# Patient Record
Sex: MALE | Race: WHITE | NOT HISPANIC OR LATINO | Employment: OTHER | URBAN - METROPOLITAN AREA
[De-identification: names, ages, dates, MRNs, and addresses within clinical notes are randomized per-mention and may not be internally consistent; named-entity substitution may affect disease eponyms.]

---

## 2018-01-25 LAB
HEP C VIRUS AB: NEGATIVE
HIV: NON REACTIVE

## 2018-01-30 LAB
CHOLEST SERPL-MSCNC: 151 MG/DL (ref 0–200)
HDLC SERPL-MCNC: 83 MG/DL
LDLC SERPL CALC-MCNC: 57 MG/DL
TRIGL SERPL-MCNC: 57 MG/DL

## 2020-01-01 ENCOUNTER — PATIENT OUTREACH (OUTPATIENT)
Dept: ADMINISTRATIVE | Facility: OTHER | Age: 65
End: 2020-01-01

## 2020-01-01 ENCOUNTER — OFFICE VISIT (OUTPATIENT)
Dept: PODIATRY | Facility: CLINIC | Age: 65
End: 2020-01-01

## 2020-01-01 ENCOUNTER — PATIENT OUTREACH (OUTPATIENT)
Dept: ADMINISTRATIVE | Facility: HOSPITAL | Age: 65
End: 2020-01-01

## 2020-01-01 ENCOUNTER — HOSPITAL ENCOUNTER (INPATIENT)
Facility: HOSPITAL | Age: 65
LOS: 8 days | DRG: 640 | End: 2020-11-17
Attending: EMERGENCY MEDICINE | Admitting: FAMILY MEDICINE
Payer: COMMERCIAL

## 2020-01-01 ENCOUNTER — HOSPITAL ENCOUNTER (OUTPATIENT)
Dept: RADIOLOGY | Facility: HOSPITAL | Age: 65
Discharge: HOME | End: 2020-08-03
Attending: PODIATRIST

## 2020-01-01 ENCOUNTER — OFFICE VISIT (OUTPATIENT)
Dept: PODIATRY | Facility: CLINIC | Age: 65
End: 2020-01-01
Payer: MEDICARE

## 2020-01-01 ENCOUNTER — TELEPHONE (OUTPATIENT)
Dept: PODIATRY | Facility: CLINIC | Age: 65
End: 2020-01-01

## 2020-01-01 ENCOUNTER — OFFICE VISIT (OUTPATIENT)
Dept: FAMILY MEDICINE | Facility: CLINIC | Age: 65
End: 2020-01-01

## 2020-01-01 ENCOUNTER — HOSPITAL ENCOUNTER (EMERGENCY)
Facility: HOSPITAL | Age: 65
Discharge: HOME | End: 2020-03-27
Attending: EMERGENCY MEDICINE

## 2020-01-01 ENCOUNTER — TELEPHONE (OUTPATIENT)
Dept: FAMILY MEDICINE | Facility: CLINIC | Age: 65
End: 2020-01-01

## 2020-01-01 VITALS
WEIGHT: 145 LBS | TEMPERATURE: 98 F | BODY MASS INDEX: 19.22 KG/M2 | HEIGHT: 73 IN | SYSTOLIC BLOOD PRESSURE: 123 MMHG | HEART RATE: 85 BPM | DIASTOLIC BLOOD PRESSURE: 74 MMHG

## 2020-01-01 VITALS
SYSTOLIC BLOOD PRESSURE: 125 MMHG | BODY MASS INDEX: 19.22 KG/M2 | WEIGHT: 145 LBS | RESPIRATION RATE: 18 BRPM | DIASTOLIC BLOOD PRESSURE: 82 MMHG | TEMPERATURE: 98 F | HEIGHT: 73 IN | HEART RATE: 82 BPM | OXYGEN SATURATION: 99 %

## 2020-01-01 VITALS
HEIGHT: 73 IN | SYSTOLIC BLOOD PRESSURE: 106 MMHG | WEIGHT: 145 LBS | HEART RATE: 63 BPM | TEMPERATURE: 98 F | DIASTOLIC BLOOD PRESSURE: 77 MMHG | BODY MASS INDEX: 19.22 KG/M2

## 2020-01-01 VITALS
HEART RATE: 94 BPM | OXYGEN SATURATION: 96 % | BODY MASS INDEX: 19.76 KG/M2 | RESPIRATION RATE: 20 BRPM | WEIGHT: 154 LBS | DIASTOLIC BLOOD PRESSURE: 80 MMHG | HEIGHT: 74 IN | TEMPERATURE: 98 F | SYSTOLIC BLOOD PRESSURE: 110 MMHG

## 2020-01-01 VITALS
WEIGHT: 145 LBS | HEIGHT: 73 IN | WEIGHT: 145 LBS | BODY MASS INDEX: 19.22 KG/M2 | SYSTOLIC BLOOD PRESSURE: 116 MMHG | SYSTOLIC BLOOD PRESSURE: 124 MMHG | HEART RATE: 102 BPM | SYSTOLIC BLOOD PRESSURE: 126 MMHG | HEIGHT: 73 IN | DIASTOLIC BLOOD PRESSURE: 83 MMHG | HEART RATE: 77 BPM | DIASTOLIC BLOOD PRESSURE: 81 MMHG | DIASTOLIC BLOOD PRESSURE: 67 MMHG | HEIGHT: 73 IN | HEART RATE: 92 BPM | BODY MASS INDEX: 19.22 KG/M2 | TEMPERATURE: 99 F | WEIGHT: 145 LBS | TEMPERATURE: 98 F | TEMPERATURE: 98 F | BODY MASS INDEX: 19.22 KG/M2

## 2020-01-01 VITALS
HEART RATE: 85 BPM | TEMPERATURE: 97 F | HEIGHT: 73 IN | BODY MASS INDEX: 19.22 KG/M2 | RESPIRATION RATE: 19 BRPM | OXYGEN SATURATION: 98 % | DIASTOLIC BLOOD PRESSURE: 80 MMHG | SYSTOLIC BLOOD PRESSURE: 125 MMHG | WEIGHT: 145 LBS

## 2020-01-01 VITALS
SYSTOLIC BLOOD PRESSURE: 87 MMHG | OXYGEN SATURATION: 97 % | WEIGHT: 173.5 LBS | HEIGHT: 73 IN | HEART RATE: 118 BPM | DIASTOLIC BLOOD PRESSURE: 64 MMHG | TEMPERATURE: 101 F | RESPIRATION RATE: 15 BRPM | BODY MASS INDEX: 22.99 KG/M2

## 2020-01-01 VITALS
DIASTOLIC BLOOD PRESSURE: 68 MMHG | WEIGHT: 141 LBS | SYSTOLIC BLOOD PRESSURE: 109 MMHG | HEIGHT: 73 IN | HEART RATE: 100 BPM | TEMPERATURE: 97 F | OXYGEN SATURATION: 99 % | BODY MASS INDEX: 18.69 KG/M2

## 2020-01-01 VITALS
HEIGHT: 73 IN | HEART RATE: 84 BPM | WEIGHT: 145 LBS | SYSTOLIC BLOOD PRESSURE: 94 MMHG | DIASTOLIC BLOOD PRESSURE: 62 MMHG | TEMPERATURE: 97 F | RESPIRATION RATE: 18 BRPM | BODY MASS INDEX: 19.22 KG/M2

## 2020-01-01 VITALS
HEART RATE: 95 BPM | HEIGHT: 73 IN | DIASTOLIC BLOOD PRESSURE: 75 MMHG | SYSTOLIC BLOOD PRESSURE: 119 MMHG | TEMPERATURE: 98 F | WEIGHT: 145 LBS | BODY MASS INDEX: 19.22 KG/M2

## 2020-01-01 VITALS
SYSTOLIC BLOOD PRESSURE: 106 MMHG | HEART RATE: 77 BPM | DIASTOLIC BLOOD PRESSURE: 67 MMHG | HEIGHT: 73 IN | TEMPERATURE: 98 F | WEIGHT: 145 LBS | BODY MASS INDEX: 19.22 KG/M2

## 2020-01-01 DIAGNOSIS — R41.82 ALTERED MENTAL STATUS: ICD-10-CM

## 2020-01-01 DIAGNOSIS — S81.801D WOUND OF RIGHT LOWER EXTREMITY, SUBSEQUENT ENCOUNTER: ICD-10-CM

## 2020-01-01 DIAGNOSIS — I87.2 VENOUS STASIS DERMATITIS OF BOTH LOWER EXTREMITIES: ICD-10-CM

## 2020-01-01 DIAGNOSIS — J44.9 CHRONIC OBSTRUCTIVE PULMONARY DISEASE, UNSPECIFIED COPD TYPE: ICD-10-CM

## 2020-01-01 DIAGNOSIS — J44.9 CHRONIC OBSTRUCTIVE PULMONARY DISEASE: ICD-10-CM

## 2020-01-01 DIAGNOSIS — M62.82 NON-TRAUMATIC RHABDOMYOLYSIS: ICD-10-CM

## 2020-01-01 DIAGNOSIS — I87.2 VENOUS STASIS DERMATITIS OF BOTH LOWER EXTREMITIES: Primary | ICD-10-CM

## 2020-01-01 DIAGNOSIS — S81.801A WOUND OF RIGHT LOWER EXTREMITY, INITIAL ENCOUNTER: ICD-10-CM

## 2020-01-01 DIAGNOSIS — Z20.822 SUSPECTED COVID-19 VIRUS INFECTION: ICD-10-CM
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
## 2020-01-01 DIAGNOSIS — S81.801D WOUND OF RIGHT LOWER EXTREMITY, SUBSEQUENT ENCOUNTER: Primary | ICD-10-CM

## 2020-01-01 DIAGNOSIS — T85.79XA: ICD-10-CM

## 2020-01-01 DIAGNOSIS — R55 SYNCOPE AND COLLAPSE: Primary | ICD-10-CM

## 2020-01-01 DIAGNOSIS — J96.01 ACUTE RESPIRATORY FAILURE WITH HYPOXIA AND HYPERCAPNIA: ICD-10-CM

## 2020-01-01 DIAGNOSIS — R60.0 EDEMA OF LEG: ICD-10-CM

## 2020-01-01 DIAGNOSIS — A41.9 SEPSIS, DUE TO UNSPECIFIED ORGANISM, UNSPECIFIED WHETHER ACUTE ORGAN DYSFUNCTION PRESENT: ICD-10-CM

## 2020-01-01 DIAGNOSIS — N30.01 ACUTE CYSTITIS WITH HEMATURIA: ICD-10-CM

## 2020-01-01 DIAGNOSIS — I83.019 VENOUS ULCER OF RIGHT LEG: ICD-10-CM

## 2020-01-01 DIAGNOSIS — Z45.2 ENCOUNTER FOR INTRAVENOUS LINE PLACEMENT: ICD-10-CM

## 2020-01-01 DIAGNOSIS — J96.02 ACUTE RESPIRATORY FAILURE WITH HYPOXIA AND HYPERCAPNIA: ICD-10-CM

## 2020-01-01 DIAGNOSIS — I73.9 PERIPHERAL VASCULAR DISEASE: Primary | ICD-10-CM

## 2020-01-01 DIAGNOSIS — S99.911A INJURY OF RIGHT ANKLE, INITIAL ENCOUNTER: ICD-10-CM

## 2020-01-01 DIAGNOSIS — M79.89 LEG SWELLING: ICD-10-CM

## 2020-01-01 DIAGNOSIS — L97.919 VENOUS ULCER OF RIGHT LEG: ICD-10-CM

## 2020-01-01 DIAGNOSIS — Z12.11 COLON CANCER SCREENING: ICD-10-CM

## 2020-01-01 DIAGNOSIS — S99.911D INJURY OF RIGHT ANKLE, SUBSEQUENT ENCOUNTER: ICD-10-CM

## 2020-01-01 DIAGNOSIS — K70.30 ALCOHOLIC CIRRHOSIS: ICD-10-CM

## 2020-01-01 DIAGNOSIS — I73.9 PERIPHERAL VASCULAR DISEASE: ICD-10-CM

## 2020-01-01 DIAGNOSIS — E87.1 HYPONATREMIA: ICD-10-CM

## 2020-01-01 DIAGNOSIS — S99.911D INJURY OF RIGHT ANKLE, SUBSEQUENT ENCOUNTER: Primary | ICD-10-CM
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
## 2020-01-01 DIAGNOSIS — I95.9 HYPOTENSION, UNSPECIFIED HYPOTENSION TYPE: ICD-10-CM

## 2020-01-01 DIAGNOSIS — R60.0 MILD PERIPHERAL EDEMA: Primary | ICD-10-CM

## 2020-01-01 DIAGNOSIS — S81.801A WOUND OF RIGHT LOWER EXTREMITY, INITIAL ENCOUNTER: Primary | ICD-10-CM

## 2020-01-01 DIAGNOSIS — R19.09 LUMP IN THE GROIN: ICD-10-CM

## 2020-01-01 LAB
ABO + RH BLD: NORMAL
ALBUMIN SERPL BCP-MCNC: 1.8 G/DL (ref 3.5–5.2)
ALBUMIN SERPL BCP-MCNC: 2 G/DL (ref 3.5–5.2)
ALBUMIN SERPL BCP-MCNC: 2.4 G/DL (ref 3.5–5.2)
ALBUMIN SERPL BCP-MCNC: 2.5 G/DL (ref 3.5–5.2)
ALBUMIN SERPL BCP-MCNC: 2.6 G/DL (ref 3.5–5.2)
ALBUMIN SERPL BCP-MCNC: 2.9 G/DL (ref 3.5–5.2)
ALBUMIN SERPL BCP-MCNC: 3.2 G/DL (ref 3.5–5.2)
ALBUMIN SERPL BCP-MCNC: 3.4 G/DL (ref 3.5–5.2)
ALBUMIN SERPL BCP-MCNC: 3.6 G/DL (ref 3.5–5.2)
ALLENS TEST: ABNORMAL
ALP SERPL-CCNC: 39 U/L (ref 55–135)
ALP SERPL-CCNC: 44 U/L (ref 55–135)
ALP SERPL-CCNC: 49 U/L (ref 55–135)
ALP SERPL-CCNC: 50 U/L (ref 55–135)
ALP SERPL-CCNC: 55 U/L (ref 55–135)
ALP SERPL-CCNC: 56 U/L (ref 55–135)
ALP SERPL-CCNC: 62 U/L (ref 55–135)
ALP SERPL-CCNC: 66 U/L (ref 55–135)
ALP SERPL-CCNC: 86 U/L (ref 55–135)
ALT SERPL W/O P-5'-P-CCNC: 104 U/L (ref 10–44)
ALT SERPL W/O P-5'-P-CCNC: 114 U/L (ref 10–44)
ALT SERPL W/O P-5'-P-CCNC: 161 U/L (ref 10–44)
ALT SERPL W/O P-5'-P-CCNC: 26 U/L (ref 10–44)
ALT SERPL W/O P-5'-P-CCNC: 30 U/L (ref 10–44)
ALT SERPL W/O P-5'-P-CCNC: 36 U/L (ref 10–44)
ALT SERPL W/O P-5'-P-CCNC: 46 U/L (ref 10–44)
ALT SERPL W/O P-5'-P-CCNC: 56 U/L (ref 10–44)
ALT SERPL W/O P-5'-P-CCNC: 73 U/L (ref 10–44)
AMMONIA PLAS-SCNC: 31 UMOL/L (ref 10–50)
AMPHET+METHAMPHET UR QL: NEGATIVE
ANION GAP SERPL CALC-SCNC: 10 MMOL/L (ref 8–16)
ANION GAP SERPL CALC-SCNC: 12 MMOL/L (ref 8–16)
ANION GAP SERPL CALC-SCNC: 13 MMOL/L (ref 8–16)
ANION GAP SERPL CALC-SCNC: 16 MMOL/L (ref 8–16)
ANION GAP SERPL CALC-SCNC: 16 MMOL/L (ref 8–16)
ANION GAP SERPL CALC-SCNC: 4 MMOL/L (ref 8–16)
ANION GAP SERPL CALC-SCNC: 5 MMOL/L (ref 8–16)
ANION GAP SERPL CALC-SCNC: 6 MMOL/L (ref 8–16)
ANION GAP SERPL CALC-SCNC: 7 MMOL/L (ref 8–16)
ANION GAP SERPL CALC-SCNC: 8 MMOL/L (ref 8–16)
ANION GAP SERPL CALC-SCNC: 9 MMOL/L (ref 8–16)
ANION GAP SERPL CALC-SCNC: 9 MMOL/L (ref 8–16)
APAP SERPL-MCNC: <10 UG/ML (ref 10–20)
APTT BLDCRRT: 29.4 SEC (ref 21–32)
AST SERPL-CCNC: 175 U/L (ref 10–40)
AST SERPL-CCNC: 242 U/L (ref 10–40)
AST SERPL-CCNC: 36 U/L (ref 10–40)
AST SERPL-CCNC: 37 U/L (ref 10–40)
AST SERPL-CCNC: 40 U/L (ref 10–40)
AST SERPL-CCNC: 484 U/L (ref 10–40)
AST SERPL-CCNC: 52 U/L (ref 10–40)
AST SERPL-CCNC: 67 U/L (ref 10–40)
AST SERPL-CCNC: 92 U/L (ref 10–40)
BACTERIA #/AREA URNS HPF: ABNORMAL /HPF
BACTERIA BLD CULT: NORMAL
BACTERIA BLD CULT: NORMAL
BACTERIA SPEC AEROBE CULT: ABNORMAL
BACTERIA UR CULT: ABNORMAL
BARBITURATES UR QL SCN>200 NG/ML: NORMAL
BASOPHILS # BLD AUTO: 0.01 K/UL (ref 0–0.2)
BASOPHILS # BLD AUTO: 0.02 K/UL (ref 0–0.2)
BASOPHILS # BLD AUTO: 0.02 K/UL (ref 0–0.2)
BASOPHILS # BLD AUTO: ABNORMAL K/UL (ref 0–0.2)
BASOPHILS NFR BLD: 0 % (ref 0–1.9)
BASOPHILS NFR BLD: 0.2 % (ref 0–1.9)
BASOPHILS NFR BLD: 0.3 % (ref 0–1.9)
BASOPHILS NFR BLD: 0.5 % (ref 0–1.9)
BASOPHILS NFR BLD: 0.5 % (ref 0–1.9)
BENZODIAZ UR QL SCN>200 NG/ML: NEGATIVE
BILIRUB SERPL-MCNC: 0.9 MG/DL (ref 0.1–1)
BILIRUB SERPL-MCNC: 1 MG/DL (ref 0.1–1)
BILIRUB SERPL-MCNC: 1 MG/DL (ref 0.1–1)
BILIRUB SERPL-MCNC: 1.1 MG/DL (ref 0.1–1)
BILIRUB SERPL-MCNC: 1.1 MG/DL (ref 0.1–1)
BILIRUB SERPL-MCNC: 1.2 MG/DL (ref 0.1–1)
BILIRUB SERPL-MCNC: 1.5 MG/DL (ref 0.1–1)
BILIRUB SERPL-MCNC: 1.6 MG/DL (ref 0.1–1)
BILIRUB SERPL-MCNC: 1.7 MG/DL (ref 0.1–1)
BILIRUB UR QL STRIP: NEGATIVE
BLD GP AB SCN CELLS X3 SERPL QL: NORMAL
BLD PROD TYP BPU: NORMAL
BLD PROD TYP BPU: NORMAL
BLOOD UNIT EXPIRATION DATE: NORMAL
BLOOD UNIT EXPIRATION DATE: NORMAL
BLOOD UNIT TYPE CODE: 5100
BLOOD UNIT TYPE CODE: 5100
BLOOD UNIT TYPE: NORMAL
BLOOD UNIT TYPE: NORMAL
BNP SERPL-MCNC: 122 PG/ML (ref 0–99)
BUN SERPL-MCNC: 10 MG/DL (ref 8–23)
BUN SERPL-MCNC: 5 MG/DL (ref 8–23)
BUN SERPL-MCNC: 6 MG/DL (ref 8–23)
BUN SERPL-MCNC: 7 MG/DL (ref 8–23)
BUN SERPL-MCNC: 8 MG/DL (ref 8–23)
BUN SERPL-MCNC: 8 MG/DL (ref 8–23)
BUN SERPL-MCNC: 9 MG/DL (ref 8–23)
BUN SERPL-MCNC: 9 MG/DL (ref 8–23)
BUN SERPL-MCNC: <5 MG/DL (ref 8–23)
BZE UR QL SCN: NEGATIVE
CALCIUM SERPL-MCNC: 6.6 MG/DL (ref 8.7–10.5)
CALCIUM SERPL-MCNC: 6.7 MG/DL (ref 8.7–10.5)
CALCIUM SERPL-MCNC: 6.7 MG/DL (ref 8.7–10.5)
CALCIUM SERPL-MCNC: 7 MG/DL (ref 8.7–10.5)
CALCIUM SERPL-MCNC: 7.1 MG/DL (ref 8.7–10.5)
CALCIUM SERPL-MCNC: 7.2 MG/DL (ref 8.7–10.5)
CALCIUM SERPL-MCNC: 7.3 MG/DL (ref 8.7–10.5)
CALCIUM SERPL-MCNC: 7.4 MG/DL (ref 8.7–10.5)
CALCIUM SERPL-MCNC: 7.5 MG/DL (ref 8.7–10.5)
CALCIUM SERPL-MCNC: 7.6 MG/DL (ref 8.7–10.5)
CALCIUM SERPL-MCNC: 7.7 MG/DL (ref 8.7–10.5)
CALCIUM SERPL-MCNC: 7.8 MG/DL (ref 8.7–10.5)
CALCIUM SERPL-MCNC: 8.1 MG/DL (ref 8.7–10.5)
CANNABINOIDS UR QL SCN: NEGATIVE
CHLORIDE SERPL-SCNC: 72 MMOL/L (ref 95–110)
CHLORIDE SERPL-SCNC: 78 MMOL/L (ref 95–110)
CHLORIDE SERPL-SCNC: 82 MMOL/L (ref 95–110)
CHLORIDE SERPL-SCNC: 82 MMOL/L (ref 95–110)
CHLORIDE SERPL-SCNC: 83 MMOL/L (ref 95–110)
CHLORIDE SERPL-SCNC: 84 MMOL/L (ref 95–110)
CHLORIDE SERPL-SCNC: 86 MMOL/L (ref 95–110)
CHLORIDE SERPL-SCNC: 86 MMOL/L (ref 95–110)
CHLORIDE SERPL-SCNC: 87 MMOL/L (ref 95–110)
CHLORIDE SERPL-SCNC: 89 MMOL/L (ref 95–110)
CHLORIDE SERPL-SCNC: 90 MMOL/L (ref 95–110)
CHLORIDE SERPL-SCNC: 91 MMOL/L (ref 95–110)
CHLORIDE SERPL-SCNC: 91 MMOL/L (ref 95–110)
CHLORIDE SERPL-SCNC: 92 MMOL/L (ref 95–110)
CHLORIDE SERPL-SCNC: 93 MMOL/L (ref 95–110)
CHLORIDE SERPL-SCNC: 94 MMOL/L (ref 95–110)
CHLORIDE SERPL-SCNC: 94 MMOL/L (ref 95–110)
CHLORIDE SERPL-SCNC: 96 MMOL/L (ref 95–110)
CHLORIDE SERPL-SCNC: 96 MMOL/L (ref 95–110)
CHLORIDE SERPL-SCNC: 97 MMOL/L (ref 95–110)
CK SERPL-CCNC: 1063 U/L (ref 20–200)
CK SERPL-CCNC: 132 U/L (ref 20–200)
CK SERPL-CCNC: 1503 U/L (ref 20–200)
CK SERPL-CCNC: 166 U/L (ref 20–200)
CK SERPL-CCNC: 1837 U/L (ref 20–200)
CK SERPL-CCNC: 189 U/L (ref 20–200)
CK SERPL-CCNC: 242 U/L (ref 20–200)
CK SERPL-CCNC: 2526 U/L (ref 20–200)
CK SERPL-CCNC: 277 U/L (ref 20–200)
CK SERPL-CCNC: 289 U/L (ref 20–200)
CK SERPL-CCNC: 323 U/L (ref 20–200)
CK SERPL-CCNC: 4093 U/L (ref 20–200)
CK SERPL-CCNC: 551 U/L (ref 20–200)
CK SERPL-CCNC: 678 U/L (ref 20–200)
CK SERPL-CCNC: 707 U/L (ref 20–200)
CK SERPL-CCNC: 7086 U/L (ref 20–200)
CK SERPL-CCNC: 7086 U/L (ref 20–200)
CLARITY UR: CLEAR
CO2 SERPL-SCNC: 22 MMOL/L (ref 23–29)
CO2 SERPL-SCNC: 24 MMOL/L (ref 23–29)
CO2 SERPL-SCNC: 24 MMOL/L (ref 23–29)
CO2 SERPL-SCNC: 25 MMOL/L (ref 23–29)
CO2 SERPL-SCNC: 26 MMOL/L (ref 23–29)
CO2 SERPL-SCNC: 27 MMOL/L (ref 23–29)
CO2 SERPL-SCNC: 28 MMOL/L (ref 23–29)
CO2 SERPL-SCNC: 28 MMOL/L (ref 23–29)
CO2 SERPL-SCNC: 29 MMOL/L (ref 23–29)
CO2 SERPL-SCNC: 29 MMOL/L (ref 23–29)
CODING SYSTEM: NORMAL
CODING SYSTEM: NORMAL
COLOR UR: YELLOW
CREAT SERPL-MCNC: 0.3 MG/DL (ref 0.5–1.4)
CREAT SERPL-MCNC: 0.4 MG/DL (ref 0.5–1.4)
CREAT SERPL-MCNC: 0.5 MG/DL (ref 0.5–1.4)
CREAT SERPL-MCNC: <0.3 MG/DL (ref 0.5–1.4)
CREAT UR-MCNC: 67.3 MG/DL (ref 23–375)
CRP SERPL-MCNC: 5.27 MG/DL (ref 0–0.75)
DELSYS: ABNORMAL
DIFFERENTIAL METHOD: ABNORMAL
DISPENSE STATUS: NORMAL
DISPENSE STATUS: NORMAL
EOSINOPHIL # BLD AUTO: 0 K/UL (ref 0–0.5)
EOSINOPHIL # BLD AUTO: ABNORMAL K/UL (ref 0–0.5)
EOSINOPHIL NFR BLD: 0 % (ref 0–8)
EOSINOPHIL NFR BLD: 0 % (ref 0–8)
EOSINOPHIL NFR BLD: 0.3 % (ref 0–8)
EOSINOPHIL NFR BLD: 0.5 % (ref 0–8)
EOSINOPHIL NFR BLD: 0.8 % (ref 0–8)
EOSINOPHIL NFR BLD: 0.9 % (ref 0–8)
EOSINOPHIL NFR BLD: 3 % (ref 0–8)
EOSINOPHIL NFR BLD: 5 % (ref 0–8)
EP: 6
ERYTHROCYTE [DISTWIDTH] IN BLOOD BY AUTOMATED COUNT: 12.3 % (ref 11.5–14.5)
ERYTHROCYTE [DISTWIDTH] IN BLOOD BY AUTOMATED COUNT: 12.9 % (ref 11.5–14.5)
ERYTHROCYTE [DISTWIDTH] IN BLOOD BY AUTOMATED COUNT: 13.3 % (ref 11.5–14.5)
ERYTHROCYTE [DISTWIDTH] IN BLOOD BY AUTOMATED COUNT: 13.6 % (ref 11.5–14.5)
ERYTHROCYTE [DISTWIDTH] IN BLOOD BY AUTOMATED COUNT: 13.8 % (ref 11.5–14.5)
ERYTHROCYTE [DISTWIDTH] IN BLOOD BY AUTOMATED COUNT: 13.8 % (ref 11.5–14.5)
ERYTHROCYTE [DISTWIDTH] IN BLOOD BY AUTOMATED COUNT: 14.1 % (ref 11.5–14.5)
ERYTHROCYTE [DISTWIDTH] IN BLOOD BY AUTOMATED COUNT: 14.3 % (ref 11.5–14.5)
ERYTHROCYTE [DISTWIDTH] IN BLOOD BY AUTOMATED COUNT: 14.4 % (ref 11.5–14.5)
ERYTHROCYTE [DISTWIDTH] IN BLOOD BY AUTOMATED COUNT: 14.5 % (ref 11.5–14.5)
ERYTHROCYTE [SEDIMENTATION RATE] IN BLOOD BY WESTERGREN METHOD: 16 MM/H
ERYTHROCYTE [SEDIMENTATION RATE] IN BLOOD BY WESTERGREN METHOD: 18 MM/H
EST. GFR  (AFRICAN AMERICAN): >60 ML/MIN/1.73 M^2
EST. GFR  (NON AFRICAN AMERICAN): >60 ML/MIN/1.73 M^2
ETHANOL SERPL-MCNC: <5 MG/DL
FERRITIN SERPL-MCNC: 1230 NG/ML (ref 20–300)
FIO2: 32
FIO2: 35
FIO2: 40
FIO2: 45
FIO2: 50
FIO2: 60
FIO2: 60
FLOW: 3
GLUCOSE SERPL-MCNC: 101 MG/DL (ref 70–110)
GLUCOSE SERPL-MCNC: 109 MG/DL (ref 70–110)
GLUCOSE SERPL-MCNC: 110 MG/DL (ref 70–110)
GLUCOSE SERPL-MCNC: 112 MG/DL (ref 70–110)
GLUCOSE SERPL-MCNC: 112 MG/DL (ref 70–110)
GLUCOSE SERPL-MCNC: 116 MG/DL (ref 70–110)
GLUCOSE SERPL-MCNC: 118 MG/DL (ref 70–110)
GLUCOSE SERPL-MCNC: 118 MG/DL (ref 70–110)
GLUCOSE SERPL-MCNC: 122 MG/DL (ref 70–110)
GLUCOSE SERPL-MCNC: 122 MG/DL (ref 70–110)
GLUCOSE SERPL-MCNC: 134 MG/DL (ref 70–110)
GLUCOSE SERPL-MCNC: 137 MG/DL (ref 70–110)
GLUCOSE SERPL-MCNC: 140 MG/DL (ref 70–110)
GLUCOSE SERPL-MCNC: 152 MG/DL (ref 70–110)
GLUCOSE SERPL-MCNC: 153 MG/DL (ref 70–110)
GLUCOSE SERPL-MCNC: 158 MG/DL (ref 70–110)
GLUCOSE SERPL-MCNC: 160 MG/DL (ref 70–110)
GLUCOSE SERPL-MCNC: 170 MG/DL (ref 70–110)
GLUCOSE SERPL-MCNC: 170 MG/DL (ref 70–110)
GLUCOSE SERPL-MCNC: 180 MG/DL (ref 70–110)
GLUCOSE SERPL-MCNC: 185 MG/DL (ref 70–110)
GLUCOSE SERPL-MCNC: 188 MG/DL (ref 70–110)
GLUCOSE SERPL-MCNC: 190 MG/DL (ref 70–110)
GLUCOSE SERPL-MCNC: 89 MG/DL (ref 70–110)
GLUCOSE SERPL-MCNC: 93 MG/DL (ref 70–110)
GLUCOSE SERPL-MCNC: 98 MG/DL (ref 70–110)
GLUCOSE SERPL-MCNC: 99 MG/DL (ref 70–110)
GLUCOSE UR QL STRIP: NEGATIVE
HCO3 UR-SCNC: 23.3 MMOL/L (ref 24–28)
HCO3 UR-SCNC: 25.1 MMOL/L (ref 24–28)
HCO3 UR-SCNC: 25.4 MMOL/L (ref 24–28)
HCO3 UR-SCNC: 25.6 MMOL/L (ref 24–28)
HCO3 UR-SCNC: 25.8 MMOL/L (ref 24–28)
HCO3 UR-SCNC: 26.6 MMOL/L (ref 24–28)
HCO3 UR-SCNC: 27.1 MMOL/L (ref 24–28)
HCO3 UR-SCNC: 27.6 MMOL/L (ref 24–28)
HCO3 UR-SCNC: 27.8 MMOL/L (ref 24–28)
HCO3 UR-SCNC: 28.1 MMOL/L (ref 24–28)
HCO3 UR-SCNC: 28.4 MMOL/L (ref 24–28)
HCO3 UR-SCNC: 28.8 MMOL/L (ref 24–28)
HCO3 UR-SCNC: 29.1 MMOL/L (ref 24–28)
HCO3 UR-SCNC: 30.4 MMOL/L (ref 24–28)
HCO3 UR-SCNC: 31.8 MMOL/L (ref 24–28)
HCT VFR BLD AUTO: 16.6 % (ref 40–54)
HCT VFR BLD AUTO: 22.5 % (ref 40–54)
HCT VFR BLD AUTO: 23.4 % (ref 40–54)
HCT VFR BLD AUTO: 23.5 % (ref 40–54)
HCT VFR BLD AUTO: 24 % (ref 40–54)
HCT VFR BLD AUTO: 24.3 % (ref 40–54)
HCT VFR BLD AUTO: 24.5 % (ref 40–54)
HCT VFR BLD AUTO: 27.8 % (ref 40–54)
HCT VFR BLD AUTO: 29.2 % (ref 40–54)
HCT VFR BLD AUTO: 33 % (ref 40–54)
HGB BLD-MCNC: 10.4 G/DL (ref 14–18)
HGB BLD-MCNC: 10.9 G/DL (ref 14–18)
HGB BLD-MCNC: 11.7 G/DL (ref 14–18)
HGB BLD-MCNC: 6 G/DL (ref 14–18)
HGB BLD-MCNC: 7.9 G/DL (ref 14–18)
HGB BLD-MCNC: 8.1 G/DL (ref 14–18)
HGB BLD-MCNC: 8.2 G/DL (ref 14–18)
HGB BLD-MCNC: 8.3 G/DL (ref 14–18)
HGB BLD-MCNC: 8.5 G/DL (ref 14–18)
HGB BLD-MCNC: 8.6 G/DL (ref 14–18)
HGB UR QL STRIP: ABNORMAL
HYALINE CASTS #/AREA URNS LPF: 0 /LPF
IMM GRANULOCYTES # BLD AUTO: 0.02 K/UL (ref 0–0.04)
IMM GRANULOCYTES # BLD AUTO: 0.03 K/UL (ref 0–0.04)
IMM GRANULOCYTES # BLD AUTO: 0.03 K/UL (ref 0–0.04)
IMM GRANULOCYTES # BLD AUTO: 0.04 K/UL (ref 0–0.04)
IMM GRANULOCYTES # BLD AUTO: ABNORMAL K/UL (ref 0–0.04)
IMM GRANULOCYTES NFR BLD AUTO: 0.5 % (ref 0–0.5)
IMM GRANULOCYTES NFR BLD AUTO: 0.5 % (ref 0–0.5)
IMM GRANULOCYTES NFR BLD AUTO: 0.6 % (ref 0–0.5)
IMM GRANULOCYTES NFR BLD AUTO: 0.7 % (ref 0–0.5)
IMM GRANULOCYTES NFR BLD AUTO: 0.8 % (ref 0–0.5)
IMM GRANULOCYTES NFR BLD AUTO: 1.1 % (ref 0–0.5)
IMM GRANULOCYTES NFR BLD AUTO: ABNORMAL % (ref 0–0.5)
INR PPP: 1.3 (ref 0.8–1.2)
IP: 12
KETONES UR QL STRIP: ABNORMAL
LACTATE SERPL-SCNC: 1.4 MMOL/L (ref 0.5–2.2)
LACTATE SERPL-SCNC: 2.5 MMOL/L (ref 0.5–2.2)
LDH SERPL L TO P-CCNC: 517 U/L (ref 110–260)
LEUKOCYTE ESTERASE UR QL STRIP: ABNORMAL
LYMPHOCYTES # BLD AUTO: 0.3 K/UL (ref 1–4.8)
LYMPHOCYTES # BLD AUTO: 0.4 K/UL (ref 1–4.8)
LYMPHOCYTES # BLD AUTO: 0.5 K/UL (ref 1–4.8)
LYMPHOCYTES # BLD AUTO: 0.6 K/UL (ref 1–4.8)
LYMPHOCYTES # BLD AUTO: 0.6 K/UL (ref 1–4.8)
LYMPHOCYTES # BLD AUTO: 0.7 K/UL (ref 1–4.8)
LYMPHOCYTES # BLD AUTO: ABNORMAL K/UL (ref 1–4.8)
LYMPHOCYTES NFR BLD: 12 % (ref 18–48)
LYMPHOCYTES NFR BLD: 14.2 % (ref 18–48)
LYMPHOCYTES NFR BLD: 17.2 % (ref 18–48)
LYMPHOCYTES NFR BLD: 18.1 % (ref 18–48)
LYMPHOCYTES NFR BLD: 22 % (ref 18–48)
LYMPHOCYTES NFR BLD: 24 % (ref 18–48)
LYMPHOCYTES NFR BLD: 30 % (ref 18–48)
LYMPHOCYTES NFR BLD: 8 % (ref 18–48)
LYMPHOCYTES NFR BLD: 9.6 % (ref 18–48)
LYMPHOCYTES NFR BLD: 9.8 % (ref 18–48)
MAGNESIUM SERPL-MCNC: 1 MG/DL (ref 1.6–2.6)
MAGNESIUM SERPL-MCNC: 1.2 MG/DL (ref 1.6–2.6)
MAGNESIUM SERPL-MCNC: 1.3 MG/DL (ref 1.6–2.6)
MAGNESIUM SERPL-MCNC: 1.4 MG/DL (ref 1.6–2.6)
MAGNESIUM SERPL-MCNC: 1.6 MG/DL (ref 1.6–2.6)
MCH RBC QN AUTO: 31.2 PG (ref 27–31)
MCH RBC QN AUTO: 31.3 PG (ref 27–31)
MCH RBC QN AUTO: 31.4 PG (ref 27–31)
MCH RBC QN AUTO: 31.5 PG (ref 27–31)
MCH RBC QN AUTO: 31.6 PG (ref 27–31)
MCH RBC QN AUTO: 32.1 PG (ref 27–31)
MCH RBC QN AUTO: 32.2 PG (ref 27–31)
MCH RBC QN AUTO: 32.4 PG (ref 27–31)
MCHC RBC AUTO-ENTMCNC: 33.8 G/DL (ref 32–36)
MCHC RBC AUTO-ENTMCNC: 34.6 G/DL (ref 32–36)
MCHC RBC AUTO-ENTMCNC: 34.7 G/DL (ref 32–36)
MCHC RBC AUTO-ENTMCNC: 34.9 G/DL (ref 32–36)
MCHC RBC AUTO-ENTMCNC: 35.4 G/DL (ref 32–36)
MCHC RBC AUTO-ENTMCNC: 35.5 G/DL (ref 32–36)
MCHC RBC AUTO-ENTMCNC: 36 G/DL (ref 32–36)
MCHC RBC AUTO-ENTMCNC: 36.1 G/DL (ref 32–36)
MCHC RBC AUTO-ENTMCNC: 37.3 G/DL (ref 32–36)
MCHC RBC AUTO-ENTMCNC: 37.4 G/DL (ref 32–36)
MCV RBC AUTO: 86 FL (ref 82–98)
MCV RBC AUTO: 87 FL (ref 82–98)
MCV RBC AUTO: 89 FL (ref 82–98)
MCV RBC AUTO: 89 FL (ref 82–98)
MCV RBC AUTO: 90 FL (ref 82–98)
MCV RBC AUTO: 91 FL (ref 82–98)
MCV RBC AUTO: 91 FL (ref 82–98)
MCV RBC AUTO: 93 FL (ref 82–98)
MICROSCOPIC COMMENT: ABNORMAL
MIN VOL: 7.9
MIN VOL: 8
MIN VOL: 8.6
MODE: ABNORMAL
MONOCYTES # BLD AUTO: 0.4 K/UL (ref 0.3–1)
MONOCYTES # BLD AUTO: 0.6 K/UL (ref 0.3–1)
MONOCYTES # BLD AUTO: ABNORMAL K/UL (ref 0.3–1)
MONOCYTES NFR BLD: 10 % (ref 4–15)
MONOCYTES NFR BLD: 10.1 % (ref 4–15)
MONOCYTES NFR BLD: 11.5 % (ref 4–15)
MONOCYTES NFR BLD: 12.5 % (ref 4–15)
MONOCYTES NFR BLD: 13.1 % (ref 4–15)
MONOCYTES NFR BLD: 4 % (ref 4–15)
MONOCYTES NFR BLD: 4 % (ref 4–15)
MONOCYTES NFR BLD: 6 % (ref 4–15)
MONOCYTES NFR BLD: 6 % (ref 4–15)
MONOCYTES NFR BLD: 9.4 % (ref 4–15)
NEUTROPHILS # BLD AUTO: 2.5 K/UL (ref 1.8–7.7)
NEUTROPHILS # BLD AUTO: 2.5 K/UL (ref 1.8–7.7)
NEUTROPHILS # BLD AUTO: 2.6 K/UL (ref 1.8–7.7)
NEUTROPHILS # BLD AUTO: 3.1 K/UL (ref 1.8–7.7)
NEUTROPHILS # BLD AUTO: 3.2 K/UL (ref 1.8–7.7)
NEUTROPHILS # BLD AUTO: 3.2 K/UL (ref 1.8–7.7)
NEUTROPHILS # BLD AUTO: ABNORMAL K/UL (ref 1.8–7.7)
NEUTROPHILS NFR BLD: 64 % (ref 38–73)
NEUTROPHILS NFR BLD: 66 % (ref 38–73)
NEUTROPHILS NFR BLD: 69.5 % (ref 38–73)
NEUTROPHILS NFR BLD: 69.7 % (ref 38–73)
NEUTROPHILS NFR BLD: 72 % (ref 38–73)
NEUTROPHILS NFR BLD: 73.1 % (ref 38–73)
NEUTROPHILS NFR BLD: 74.9 % (ref 38–73)
NEUTROPHILS NFR BLD: 76.7 % (ref 38–73)
NEUTROPHILS NFR BLD: 78.2 % (ref 38–73)
NEUTROPHILS NFR BLD: 85 % (ref 38–73)
NEUTS BAND NFR BLD MANUAL: 1 %
NITRITE UR QL STRIP: POSITIVE
NRBC BLD-RTO: 0 /100 WBC
NRBC BLD-RTO: 1 /100 WBC
NUM UNITS TRANS PACKED RBC: NORMAL
NUM UNITS TRANS PACKED RBC: NORMAL
OPIATES UR QL SCN: NEGATIVE
PCO2 BLDA: 39.7 MMHG (ref 35–45)
PCO2 BLDA: 40.7 MMHG (ref 35–45)
PCO2 BLDA: 41.3 MMHG (ref 35–45)
PCO2 BLDA: 41.3 MMHG (ref 35–45)
PCO2 BLDA: 42.5 MMHG (ref 35–45)
PCO2 BLDA: 43.3 MMHG (ref 35–45)
PCO2 BLDA: 43.7 MMHG (ref 35–45)
PCO2 BLDA: 43.9 MMHG (ref 35–45)
PCO2 BLDA: 45.3 MMHG (ref 35–45)
PCO2 BLDA: 45.6 MMHG (ref 35–45)
PCO2 BLDA: 45.6 MMHG (ref 35–45)
PCO2 BLDA: 45.8 MMHG (ref 35–45)
PCO2 BLDA: 51.9 MMHG (ref 35–45)
PCO2 BLDA: 55 MMHG (ref 35–45)
PCO2 BLDA: 60.5 MMHG (ref 35–45)
PCP UR QL SCN>25 NG/ML: NEGATIVE
PEEP: 5
PH SMN: 7.3 [PH] (ref 7.35–7.45)
PH SMN: 7.33 [PH] (ref 7.35–7.45)
PH SMN: 7.35 [PH] (ref 7.35–7.45)
PH SMN: 7.35 [PH] (ref 7.35–7.45)
PH SMN: 7.37 [PH] (ref 7.35–7.45)
PH SMN: 7.39 [PH] (ref 7.35–7.45)
PH SMN: 7.39 [PH] (ref 7.35–7.45)
PH SMN: 7.4 [PH] (ref 7.35–7.45)
PH SMN: 7.41 [PH] (ref 7.35–7.45)
PH SMN: 7.42 [PH] (ref 7.35–7.45)
PH SMN: 7.42 [PH] (ref 7.35–7.45)
PH SMN: 7.43 [PH] (ref 7.35–7.45)
PH SMN: 7.45 [PH] (ref 7.35–7.45)
PH UR STRIP: 7 [PH] (ref 5–8)
PHOSPHATE SERPL-MCNC: 2.7 MG/DL (ref 2.7–4.5)
PIP: 17
PIP: 22
PIP: 27
PLATELET # BLD AUTO: 27 K/UL (ref 150–350)
PLATELET # BLD AUTO: 30 K/UL (ref 150–350)
PLATELET # BLD AUTO: 32 K/UL (ref 150–350)
PLATELET # BLD AUTO: 35 K/UL (ref 150–350)
PLATELET # BLD AUTO: 36 K/UL (ref 150–350)
PLATELET # BLD AUTO: 43 K/UL (ref 150–350)
PLATELET # BLD AUTO: 44 K/UL (ref 150–350)
PLATELET # BLD AUTO: 45 K/UL (ref 150–350)
PLATELET # BLD AUTO: 50 K/UL (ref 150–350)
PLATELET # BLD AUTO: 51 K/UL (ref 150–350)
PLATELET BLD QL SMEAR: ABNORMAL
PMV BLD AUTO: 10 FL (ref 9.2–12.9)
PMV BLD AUTO: 10.3 FL (ref 9.2–12.9)
PMV BLD AUTO: 10.3 FL (ref 9.2–12.9)
PMV BLD AUTO: 10.4 FL (ref 9.2–12.9)
PMV BLD AUTO: 10.4 FL (ref 9.2–12.9)
PMV BLD AUTO: 10.7 FL (ref 9.2–12.9)
PMV BLD AUTO: 11.8 FL (ref 9.2–12.9)
PMV BLD AUTO: 9.9 FL (ref 9.2–12.9)
PO2 BLDA: 100 MMHG (ref 80–100)
PO2 BLDA: 105 MMHG (ref 80–100)
PO2 BLDA: 108 MMHG (ref 80–100)
PO2 BLDA: 113 MMHG (ref 80–100)
PO2 BLDA: 116 MMHG (ref 80–100)
PO2 BLDA: 143 MMHG (ref 80–100)
PO2 BLDA: 160 MMHG (ref 80–100)
PO2 BLDA: 191 MMHG (ref 80–100)
PO2 BLDA: 192 MMHG (ref 80–100)
PO2 BLDA: 62 MMHG (ref 80–100)
PO2 BLDA: 69 MMHG (ref 80–100)
PO2 BLDA: 75 MMHG (ref 80–100)
PO2 BLDA: 76 MMHG (ref 80–100)
PO2 BLDA: 77 MMHG (ref 80–100)
PO2 BLDA: 89 MMHG (ref 80–100)
POC BE: -1 MMOL/L
POC BE: -2 MMOL/L
POC BE: 0 MMOL/L
POC BE: 1 MMOL/L
POC BE: 1 MMOL/L
POC BE: 2 MMOL/L
POC BE: 2 MMOL/L
POC BE: 3 MMOL/L
POC BE: 3 MMOL/L
POC BE: 4 MMOL/L
POC BE: 5 MMOL/L
POC BE: 5 MMOL/L
POC BE: 6 MMOL/L
POC SATURATED O2: 100 % (ref 95–100)
POC SATURATED O2: 100 % (ref 95–100)
POC SATURATED O2: 91 % (ref 95–100)
POC SATURATED O2: 92 % (ref 95–100)
POC SATURATED O2: 94 % (ref 95–100)
POC SATURATED O2: 95 % (ref 95–100)
POC SATURATED O2: 95 % (ref 95–100)
POC SATURATED O2: 96 % (ref 95–100)
POC SATURATED O2: 98 % (ref 95–100)
POC SATURATED O2: 99 % (ref 95–100)
POC TCO2: 25 MMOL/L (ref 23–27)
POC TCO2: 27 MMOL/L (ref 23–27)
POC TCO2: 28 MMOL/L (ref 23–27)
POC TCO2: 28 MMOL/L (ref 23–27)
POC TCO2: 29 MMOL/L (ref 23–27)
POC TCO2: 30 MMOL/L (ref 23–27)
POC TCO2: 32 MMOL/L (ref 23–27)
POC TCO2: 34 MMOL/L (ref 23–27)
POCT GLUCOSE: 139 MG/DL (ref 70–110)
POTASSIUM SERPL-SCNC: 2.8 MMOL/L (ref 3.5–5.1)
POTASSIUM SERPL-SCNC: 2.9 MMOL/L (ref 3.5–5.1)
POTASSIUM SERPL-SCNC: 3 MMOL/L (ref 3.5–5.1)
POTASSIUM SERPL-SCNC: 3.2 MMOL/L (ref 3.5–5.1)
POTASSIUM SERPL-SCNC: 3.4 MMOL/L (ref 3.5–5.1)
POTASSIUM SERPL-SCNC: 3.5 MMOL/L (ref 3.5–5.1)
POTASSIUM SERPL-SCNC: 3.6 MMOL/L (ref 3.5–5.1)
POTASSIUM SERPL-SCNC: 3.7 MMOL/L (ref 3.5–5.1)
POTASSIUM SERPL-SCNC: 3.8 MMOL/L (ref 3.5–5.1)
POTASSIUM SERPL-SCNC: 3.9 MMOL/L (ref 3.5–5.1)
POTASSIUM SERPL-SCNC: 4 MMOL/L (ref 3.5–5.1)
POTASSIUM SERPL-SCNC: 4.4 MMOL/L (ref 3.5–5.1)
POTASSIUM SERPL-SCNC: 4.6 MMOL/L (ref 3.5–5.1)
POTASSIUM SERPL-SCNC: 5.2 MMOL/L (ref 3.5–5.1)
PROCALCITONIN SERPL IA-MCNC: 0.11 NG/ML
PROT SERPL-MCNC: 4 G/DL (ref 6–8.4)
PROT SERPL-MCNC: 4 G/DL (ref 6–8.4)
PROT SERPL-MCNC: 4.2 G/DL (ref 6–8.4)
PROT SERPL-MCNC: 4.3 G/DL (ref 6–8.4)
PROT SERPL-MCNC: 4.7 G/DL (ref 6–8.4)
PROT SERPL-MCNC: 4.9 G/DL (ref 6–8.4)
PROT SERPL-MCNC: 5.3 G/DL (ref 6–8.4)
PROT SERPL-MCNC: 5.3 G/DL (ref 6–8.4)
PROT SERPL-MCNC: 5.4 G/DL (ref 6–8.4)
PROT UR QL STRIP: ABNORMAL
PROTHROMBIN TIME: 12.6 SEC (ref 9–12.5)
RBC # BLD AUTO: 1.85 M/UL (ref 4.6–6.2)
RBC # BLD AUTO: 2.52 M/UL (ref 4.6–6.2)
RBC # BLD AUTO: 2.53 M/UL (ref 4.6–6.2)
RBC # BLD AUTO: 2.62 M/UL (ref 4.6–6.2)
RBC # BLD AUTO: 2.63 M/UL (ref 4.6–6.2)
RBC # BLD AUTO: 2.71 M/UL (ref 4.6–6.2)
RBC # BLD AUTO: 2.73 M/UL (ref 4.6–6.2)
RBC # BLD AUTO: 3.21 M/UL (ref 4.6–6.2)
RBC # BLD AUTO: 3.38 M/UL (ref 4.6–6.2)
RBC # BLD AUTO: 3.61 M/UL (ref 4.6–6.2)
RBC #/AREA URNS HPF: 10 /HPF (ref 0–4)
SALICYLATES SERPL-MCNC: <4 MG/DL (ref 15–30)
SAMPLE: ABNORMAL
SARS-COV-2 RDRP RESP QL NAA+PROBE: NEGATIVE
SITE: ABNORMAL
SODIUM SERPL-SCNC: 116 MMOL/L (ref 136–145)
SODIUM SERPL-SCNC: 117 MMOL/L (ref 136–145)
SODIUM SERPL-SCNC: 117 MMOL/L (ref 136–145)
SODIUM SERPL-SCNC: 118 MMOL/L (ref 136–145)
SODIUM SERPL-SCNC: 119 MMOL/L (ref 136–145)
SODIUM SERPL-SCNC: 120 MMOL/L (ref 136–145)
SODIUM SERPL-SCNC: 121 MMOL/L (ref 136–145)
SODIUM SERPL-SCNC: 121 MMOL/L (ref 136–145)
SODIUM SERPL-SCNC: 122 MMOL/L (ref 136–145)
SODIUM SERPL-SCNC: 125 MMOL/L (ref 136–145)
SODIUM SERPL-SCNC: 125 MMOL/L (ref 136–145)
SODIUM SERPL-SCNC: 126 MMOL/L (ref 136–145)
SODIUM SERPL-SCNC: 128 MMOL/L (ref 136–145)
SODIUM SERPL-SCNC: 128 MMOL/L (ref 136–145)
SODIUM SERPL-SCNC: 129 MMOL/L (ref 136–145)
SODIUM SERPL-SCNC: 130 MMOL/L (ref 136–145)
SODIUM SERPL-SCNC: 132 MMOL/L (ref 136–145)
SODIUM SERPL-SCNC: 132 MMOL/L (ref 136–145)
SODIUM SERPL-SCNC: 134 MMOL/L (ref 136–145)
SP GR UR STRIP: 1.02 (ref 1–1.03)
SP02: 100
SP02: 100
SP02: 99
TOXICOLOGY INFORMATION: NORMAL
TROPONIN I SERPL DL<=0.01 NG/ML-MCNC: 0.04 NG/ML (ref 0.02–0.5)
URN SPEC COLLECT METH UR: ABNORMAL
UROBILINOGEN UR STRIP-ACNC: 1 EU/DL
VIT B1 BLD-MCNC: 32 UG/L (ref 38–122)
VT: 500
WBC # BLD AUTO: 1.94 K/UL (ref 3.9–12.7)
WBC # BLD AUTO: 2.84 K/UL (ref 3.9–12.7)
WBC # BLD AUTO: 3.41 K/UL (ref 3.9–12.7)
WBC # BLD AUTO: 3.56 K/UL (ref 3.9–12.7)
WBC # BLD AUTO: 3.59 K/UL (ref 3.9–12.7)
WBC # BLD AUTO: 3.66 K/UL (ref 3.9–12.7)
WBC # BLD AUTO: 3.78 K/UL (ref 3.9–12.7)
WBC # BLD AUTO: 3.97 K/UL (ref 3.9–12.7)
WBC # BLD AUTO: 4.24 K/UL (ref 3.9–12.7)
WBC # BLD AUTO: 4.34 K/UL (ref 3.9–12.7)
WBC #/AREA URNS HPF: 18 /HPF (ref 0–5)

## 2020-01-01 PROCEDURE — 99999 PR PBB SHADOW E&M-EST. PATIENT-LVL III: ICD-10-PCS | Mod: PBBFAC,,, | Performed by: PODIATRIST

## 2020-01-01 PROCEDURE — 80048 BASIC METABOLIC PNL TOTAL CA: CPT | Mod: 91

## 2020-01-01 PROCEDURE — 36430 TRANSFUSION BLD/BLD COMPNT: CPT

## 2020-01-01 PROCEDURE — 83615 LACTATE (LD) (LDH) ENZYME: CPT

## 2020-01-01 PROCEDURE — 93010 EKG 12-LEAD: ICD-10-PCS | Mod: ,,, | Performed by: INTERNAL MEDICINE

## 2020-01-01 PROCEDURE — 82550 ASSAY OF CK (CPK): CPT | Mod: 91

## 2020-01-01 PROCEDURE — 85025 COMPLETE CBC W/AUTO DIFF WBC: CPT

## 2020-01-01 PROCEDURE — 27000221 HC OXYGEN, UP TO 24 HOURS

## 2020-01-01 PROCEDURE — 20000000 HC ICU ROOM

## 2020-01-01 PROCEDURE — 99900035 HC TECH TIME PER 15 MIN (STAT)

## 2020-01-01 PROCEDURE — 99999 PR PBB SHADOW E&M-EST. PATIENT-LVL IV: CPT | Mod: PBBFAC,,, | Performed by: PODIATRIST

## 2020-01-01 PROCEDURE — P9047 ALBUMIN (HUMAN), 25%, 50ML: HCPCS | Mod: JG | Performed by: FAMILY MEDICINE

## 2020-01-01 PROCEDURE — 99214 OFFICE O/P EST MOD 30 MIN: CPT | Mod: PBBFAC | Performed by: PODIATRIST

## 2020-01-01 PROCEDURE — 96365 THER/PROPH/DIAG IV INF INIT: CPT | Mod: 59

## 2020-01-01 PROCEDURE — 94761 N-INVAS EAR/PLS OXIMETRY MLT: CPT

## 2020-01-01 PROCEDURE — 63600175 PHARM REV CODE 636 W HCPCS: Mod: JG | Performed by: FAMILY MEDICINE

## 2020-01-01 PROCEDURE — 94640 AIRWAY INHALATION TREATMENT: CPT

## 2020-01-01 PROCEDURE — 25000242 PHARM REV CODE 250 ALT 637 W/ HCPCS: Performed by: FAMILY MEDICINE

## 2020-01-01 PROCEDURE — 97802 MEDICAL NUTRITION INDIV IN: CPT

## 2020-01-01 PROCEDURE — 25000003 PHARM REV CODE 250: Performed by: EMERGENCY MEDICINE

## 2020-01-01 PROCEDURE — 87077 CULTURE AEROBIC IDENTIFY: CPT

## 2020-01-01 PROCEDURE — 25000003 PHARM REV CODE 250

## 2020-01-01 PROCEDURE — 27200966 HC CLOSED SUCTION SYSTEM

## 2020-01-01 PROCEDURE — 99999 PR PBB SHADOW E&M-EST. PATIENT-LVL IV: ICD-10-PCS | Mod: PBBFAC,,, | Performed by: PODIATRIST

## 2020-01-01 PROCEDURE — 99900017 HC EXTUBATION W/PARAMETERS (STAT)

## 2020-01-01 PROCEDURE — 85007 BL SMEAR W/DIFF WBC COUNT: CPT

## 2020-01-01 PROCEDURE — 82803 BLOOD GASES ANY COMBINATION: CPT

## 2020-01-01 PROCEDURE — 94003 VENT MGMT INPAT SUBQ DAY: CPT

## 2020-01-01 PROCEDURE — 84484 ASSAY OF TROPONIN QUANT: CPT

## 2020-01-01 PROCEDURE — 86901 BLOOD TYPING SEROLOGIC RH(D): CPT

## 2020-01-01 PROCEDURE — 99284 EMERGENCY DEPT VISIT MOD MDM: CPT | Mod: 25

## 2020-01-01 PROCEDURE — 85027 COMPLETE CBC AUTOMATED: CPT

## 2020-01-01 PROCEDURE — 29580 PR STRAPPING UNNA BOOT: ICD-10-PCS | Mod: S$PBB,RT,, | Performed by: PODIATRIST

## 2020-01-01 PROCEDURE — 70450 CT HEAD/BRAIN W/O DYE: CPT | Mod: 26,,, | Performed by: RADIOLOGY

## 2020-01-01 PROCEDURE — 99212 OFFICE O/P EST SF 10 MIN: CPT | Mod: S$GLB,,, | Performed by: FAMILY MEDICINE

## 2020-01-01 PROCEDURE — 93010 ELECTROCARDIOGRAM REPORT: CPT | Mod: ,,, | Performed by: INTERNAL MEDICINE

## 2020-01-01 PROCEDURE — 63600175 PHARM REV CODE 636 W HCPCS: Performed by: HOSPITALIST

## 2020-01-01 PROCEDURE — 73630 XR FOOT COMPLETE 3 VIEW RIGHT: ICD-10-PCS | Mod: 26,RT,, | Performed by: RADIOLOGY

## 2020-01-01 PROCEDURE — 80307 DRUG TEST PRSMV CHEM ANLYZR: CPT

## 2020-01-01 PROCEDURE — 96375 TX/PRO/DX INJ NEW DRUG ADDON: CPT | Mod: 59

## 2020-01-01 PROCEDURE — 86920 COMPATIBILITY TEST SPIN: CPT

## 2020-01-01 PROCEDURE — 63600175 PHARM REV CODE 636 W HCPCS: Performed by: FAMILY MEDICINE

## 2020-01-01 PROCEDURE — 99214 PR OFFICE/OUTPT VISIT, EST, LEVL IV, 30-39 MIN: ICD-10-PCS | Mod: S$PBB,,, | Performed by: PODIATRIST

## 2020-01-01 PROCEDURE — P9016 RBC LEUKOCYTES REDUCED: HCPCS

## 2020-01-01 PROCEDURE — 99214 OFFICE O/P EST MOD 30 MIN: CPT | Mod: 25,S$PBB,, | Performed by: PODIATRIST

## 2020-01-01 PROCEDURE — 99213 PR OFFICE/OUTPT VISIT, EST, LEVL III, 20-29 MIN: ICD-10-PCS | Mod: 25,S$PBB,, | Performed by: PODIATRIST

## 2020-01-01 PROCEDURE — 25000003 PHARM REV CODE 250: Performed by: FAMILY MEDICINE

## 2020-01-01 PROCEDURE — 82550 ASSAY OF CK (CPK): CPT

## 2020-01-01 PROCEDURE — 99214 PR OFFICE/OUTPT VISIT, EST, LEVL IV, 30-39 MIN: ICD-10-PCS | Mod: 25,S$PBB,, | Performed by: PODIATRIST

## 2020-01-01 PROCEDURE — 86140 C-REACTIVE PROTEIN: CPT

## 2020-01-01 PROCEDURE — 99285 EMERGENCY DEPT VISIT HI MDM: CPT | Mod: 25

## 2020-01-01 PROCEDURE — 99214 OFFICE O/P EST MOD 30 MIN: CPT | Mod: PBBFAC,25 | Performed by: PODIATRIST

## 2020-01-01 PROCEDURE — 84100 ASSAY OF PHOSPHORUS: CPT

## 2020-01-01 PROCEDURE — 99999 PR PBB SHADOW E&M-EST. PATIENT-LVL III: CPT | Mod: PBBFAC,,, | Performed by: PODIATRIST

## 2020-01-01 PROCEDURE — 94760 N-INVAS EAR/PLS OXIMETRY 1: CPT

## 2020-01-01 PROCEDURE — 87070 CULTURE OTHR SPECIMN AEROBIC: CPT

## 2020-01-01 PROCEDURE — 63600175 PHARM REV CODE 636 W HCPCS: Performed by: EMERGENCY MEDICINE

## 2020-01-01 PROCEDURE — 27000190 HC CPAP FULL FACE MASK W/VALVE

## 2020-01-01 PROCEDURE — 99203 OFFICE O/P NEW LOW 30 MIN: CPT | Mod: S$GLB,,, | Performed by: FAMILY MEDICINE

## 2020-01-01 PROCEDURE — 36415 COLL VENOUS BLD VENIPUNCTURE: CPT

## 2020-01-01 PROCEDURE — 80320 DRUG SCREEN QUANTALCOHOLS: CPT

## 2020-01-01 PROCEDURE — 80053 COMPREHEN METABOLIC PANEL: CPT

## 2020-01-01 PROCEDURE — 25000242 PHARM REV CODE 250 ALT 637 W/ HCPCS

## 2020-01-01 PROCEDURE — 71045 X-RAY EXAM CHEST 1 VIEW: CPT | Mod: 26,76,, | Performed by: RADIOLOGY

## 2020-01-01 PROCEDURE — 96361 HYDRATE IV INFUSION ADD-ON: CPT | Mod: 59

## 2020-01-01 PROCEDURE — 29580 STRAPPING UNNA BOOT: CPT | Mod: PBBFAC,RT | Performed by: PODIATRIST

## 2020-01-01 PROCEDURE — 85730 THROMBOPLASTIN TIME PARTIAL: CPT

## 2020-01-01 PROCEDURE — 74018 XR ABDOMEN AP 1 VIEW: ICD-10-PCS | Mod: 26,,, | Performed by: RADIOLOGY

## 2020-01-01 PROCEDURE — 99214 OFFICE O/P EST MOD 30 MIN: CPT | Mod: S$PBB,,, | Performed by: PODIATRIST

## 2020-01-01 PROCEDURE — 99233 PR SUBSEQUENT HOSPITAL CARE,LEVL III: ICD-10-PCS | Mod: ,,, | Performed by: FAMILY MEDICINE

## 2020-01-01 PROCEDURE — 80048 BASIC METABOLIC PNL TOTAL CA: CPT

## 2020-01-01 PROCEDURE — 31500 INSERT EMERGENCY AIRWAY: CPT

## 2020-01-01 PROCEDURE — 36600 WITHDRAWAL OF ARTERIAL BLOOD: CPT

## 2020-01-01 PROCEDURE — 83880 ASSAY OF NATRIURETIC PEPTIDE: CPT

## 2020-01-01 PROCEDURE — 83735 ASSAY OF MAGNESIUM: CPT

## 2020-01-01 PROCEDURE — 71045 X-RAY EXAM CHEST 1 VIEW: CPT | Mod: 26,,, | Performed by: RADIOLOGY

## 2020-01-01 PROCEDURE — 99203 OFFICE O/P NEW LOW 30 MIN: CPT | Mod: S$PBB,,, | Performed by: PODIATRIST

## 2020-01-01 PROCEDURE — 93005 ELECTROCARDIOGRAM TRACING: CPT

## 2020-01-01 PROCEDURE — 82962 GLUCOSE BLOOD TEST: CPT

## 2020-01-01 PROCEDURE — 99900026 HC AIRWAY MAINTENANCE (STAT)

## 2020-01-01 PROCEDURE — 71045 XR CHEST AP PORTABLE: ICD-10-PCS | Mod: 26,,, | Performed by: RADIOLOGY

## 2020-01-01 PROCEDURE — 82140 ASSAY OF AMMONIA: CPT

## 2020-01-01 PROCEDURE — 82728 ASSAY OF FERRITIN: CPT

## 2020-01-01 PROCEDURE — 99212 PR OFFICE/OUTPT VISIT, EST, LEVL II, 10-19 MIN: ICD-10-PCS | Mod: S$GLB,,, | Performed by: FAMILY MEDICINE

## 2020-01-01 PROCEDURE — 73630 X-RAY EXAM OF FOOT: CPT | Mod: 26,RT,, | Performed by: RADIOLOGY

## 2020-01-01 PROCEDURE — 87186 SC STD MICRODIL/AGAR DIL: CPT

## 2020-01-01 PROCEDURE — 70450 CT HEAD/BRAIN W/O DYE: CPT | Mod: TC

## 2020-01-01 PROCEDURE — 80329 ANALGESICS NON-OPIOID 1 OR 2: CPT

## 2020-01-01 PROCEDURE — 87086 URINE CULTURE/COLONY COUNT: CPT

## 2020-01-01 PROCEDURE — 29580 STRAPPING UNNA BOOT: CPT | Mod: S$PBB,RT,, | Performed by: PODIATRIST

## 2020-01-01 PROCEDURE — 63600175 PHARM REV CODE 636 W HCPCS: Mod: JW,JG | Performed by: FAMILY MEDICINE

## 2020-01-01 PROCEDURE — 94002 VENT MGMT INPAT INIT DAY: CPT

## 2020-01-01 PROCEDURE — 99213 OFFICE O/P EST LOW 20 MIN: CPT | Mod: PBBFAC,25 | Performed by: PODIATRIST

## 2020-01-01 PROCEDURE — 73610 X-RAY EXAM OF ANKLE: CPT | Mod: 26,RT,, | Performed by: RADIOLOGY

## 2020-01-01 PROCEDURE — 87040 BLOOD CULTURE FOR BACTERIA: CPT | Mod: 59

## 2020-01-01 PROCEDURE — 74018 RADEX ABDOMEN 1 VIEW: CPT | Mod: TC,FY

## 2020-01-01 PROCEDURE — 99900031 HC PATIENT EDUCATION (STAT)

## 2020-01-01 PROCEDURE — 99213 OFFICE O/P EST LOW 20 MIN: CPT | Mod: 25,S$PBB,, | Performed by: PODIATRIST

## 2020-01-01 PROCEDURE — 99223 1ST HOSP IP/OBS HIGH 75: CPT | Mod: AI,,, | Performed by: FAMILY MEDICINE

## 2020-01-01 PROCEDURE — 96374 THER/PROPH/DIAG INJ IV PUSH: CPT

## 2020-01-01 PROCEDURE — 99239 PR HOSPITAL DISCHARGE DAY,>30 MIN: ICD-10-PCS | Mod: ,,, | Performed by: FAMILY MEDICINE

## 2020-01-01 PROCEDURE — 99203 PR OFFICE/OUTPT VISIT, NEW, LEVL III, 30-44 MIN: ICD-10-PCS | Mod: S$PBB,,, | Performed by: PODIATRIST

## 2020-01-01 PROCEDURE — 85610 PROTHROMBIN TIME: CPT

## 2020-01-01 PROCEDURE — 63600175 PHARM REV CODE 636 W HCPCS

## 2020-01-01 PROCEDURE — 99223 PR INITIAL HOSPITAL CARE,LEVL III: ICD-10-PCS | Mod: AI,,, | Performed by: FAMILY MEDICINE

## 2020-01-01 PROCEDURE — U0002 COVID-19 LAB TEST NON-CDC: HCPCS

## 2020-01-01 PROCEDURE — 84132 ASSAY OF SERUM POTASSIUM: CPT

## 2020-01-01 PROCEDURE — 73610 X-RAY EXAM OF ANKLE: CPT | Mod: TC,FY,RT

## 2020-01-01 PROCEDURE — 99203 PR OFFICE/OUTPT VISIT, NEW, LEVL III, 30-44 MIN: ICD-10-PCS | Mod: S$GLB,,, | Performed by: FAMILY MEDICINE

## 2020-01-01 PROCEDURE — 74018 RADEX ABDOMEN 1 VIEW: CPT | Mod: 26,,, | Performed by: RADIOLOGY

## 2020-01-01 PROCEDURE — 70450 CT HEAD WITHOUT CONTRAST: ICD-10-PCS | Mod: 26,,, | Performed by: RADIOLOGY

## 2020-01-01 PROCEDURE — 99239 HOSP IP/OBS DSCHRG MGMT >30: CPT | Mod: ,,, | Performed by: FAMILY MEDICINE

## 2020-01-01 PROCEDURE — 73610 XR ANKLE COMPLETE 3 VIEW RIGHT: ICD-10-PCS | Mod: 26,RT,, | Performed by: RADIOLOGY

## 2020-01-01 PROCEDURE — 73630 X-RAY EXAM OF FOOT: CPT | Mod: TC,FY,RT

## 2020-01-01 PROCEDURE — 99233 SBSQ HOSP IP/OBS HIGH 50: CPT | Mod: ,,, | Performed by: FAMILY MEDICINE

## 2020-01-01 PROCEDURE — 87088 URINE BACTERIA CULTURE: CPT

## 2020-01-01 PROCEDURE — 84425 ASSAY OF VITAMIN B-1: CPT

## 2020-01-01 PROCEDURE — 83605 ASSAY OF LACTIC ACID: CPT

## 2020-01-01 PROCEDURE — 84145 PROCALCITONIN (PCT): CPT

## 2020-01-01 PROCEDURE — 81000 URINALYSIS NONAUTO W/SCOPE: CPT | Mod: 59

## 2020-01-01 PROCEDURE — 71045 X-RAY EXAM CHEST 1 VIEW: CPT | Mod: TC,FY

## 2020-01-01 PROCEDURE — P9047 ALBUMIN (HUMAN), 25%, 50ML: HCPCS | Mod: JW,JG | Performed by: FAMILY MEDICINE

## 2020-01-01 RX ORDER — POTASSIUM CHLORIDE 14.9 MG/ML
60 INJECTION INTRAVENOUS
Status: DISCONTINUED | OUTPATIENT
Start: 2020-01-01 | End: 2020-01-01

## 2020-01-01 RX ORDER — SODIUM CHLORIDE 9 MG/ML
1000 INJECTION, SOLUTION INTRAVENOUS CONTINUOUS
Status: DISCONTINUED | OUTPATIENT
Start: 2020-01-01 | End: 2020-01-01

## 2020-01-01 RX ORDER — FUROSEMIDE 40 MG/1
40 TABLET ORAL DAILY
Qty: 7 TABLET | Refills: 0 | Status: SHIPPED | OUTPATIENT
Start: 2020-01-01 | End: 2020-01-01

## 2020-01-01 RX ORDER — PRIMIDONE 250 MG/1
TABLET ORAL
COMMUNITY
End: 2020-01-01

## 2020-01-01 RX ORDER — ONDANSETRON 2 MG/ML
4 INJECTION INTRAMUSCULAR; INTRAVENOUS EVERY 8 HOURS PRN
Status: DISCONTINUED | OUTPATIENT
Start: 2020-01-01 | End: 2020-11-18 | Stop reason: HOSPADM

## 2020-01-01 RX ORDER — IPRATROPIUM BROMIDE AND ALBUTEROL SULFATE 2.5; .5 MG/3ML; MG/3ML
3 SOLUTION RESPIRATORY (INHALATION) EVERY 6 HOURS
Status: DISCONTINUED | OUTPATIENT
Start: 2020-01-01 | End: 2020-01-01

## 2020-01-01 RX ORDER — NOREPINEPHRINE BITARTRATE/D5W 4MG/250ML
0.05 PLASTIC BAG, INJECTION (ML) INTRAVENOUS CONTINUOUS
Status: DISCONTINUED | OUTPATIENT
Start: 2020-01-01 | End: 2020-01-01

## 2020-01-01 RX ORDER — POTASSIUM CHLORIDE 14.9 MG/ML
40 INJECTION INTRAVENOUS ONCE
Status: COMPLETED | OUTPATIENT
Start: 2020-01-01 | End: 2020-01-01

## 2020-01-01 RX ORDER — ALBUTEROL SULFATE 90 UG/1
2 AEROSOL, METERED RESPIRATORY (INHALATION)
COMMUNITY
Start: 2020-01-01 | End: 2020-01-01 | Stop reason: SDUPTHER

## 2020-01-01 RX ORDER — DEXMEDETOMIDINE HYDROCHLORIDE 4 UG/ML
0.2 INJECTION, SOLUTION INTRAVENOUS CONTINUOUS
Status: DISCONTINUED | OUTPATIENT
Start: 2020-01-01 | End: 2020-01-01

## 2020-01-01 RX ORDER — SILVER SULFADIAZINE 10 G/1000G
CREAM TOPICAL DAILY
Qty: 50 G | Refills: 2 | Status: SHIPPED | OUTPATIENT
Start: 2020-01-01 | End: 2020-01-01

## 2020-01-01 RX ORDER — MUPIROCIN CALCIUM 20 MG/G
CREAM TOPICAL 2 TIMES DAILY
Qty: 15 G | Refills: 0 | Status: SHIPPED | OUTPATIENT
Start: 2020-01-01 | End: 2020-01-01

## 2020-01-01 RX ORDER — IPRATROPIUM BROMIDE AND ALBUTEROL SULFATE 2.5; .5 MG/3ML; MG/3ML
3 SOLUTION RESPIRATORY (INHALATION)
Status: DISCONTINUED | OUTPATIENT
Start: 2020-01-01 | End: 2020-11-18 | Stop reason: HOSPADM

## 2020-01-01 RX ORDER — MAGNESIUM SULFATE HEPTAHYDRATE 40 MG/ML
2 INJECTION, SOLUTION INTRAVENOUS
Status: DISCONTINUED | OUTPATIENT
Start: 2020-01-01 | End: 2020-01-01

## 2020-01-01 RX ORDER — ETOMIDATE 2 MG/ML
20 INJECTION INTRAVENOUS
Status: COMPLETED | OUTPATIENT
Start: 2020-01-01 | End: 2020-01-01

## 2020-01-01 RX ORDER — LORAZEPAM 2 MG/ML
1 INJECTION INTRAMUSCULAR
Status: DISCONTINUED | OUTPATIENT
Start: 2020-01-01 | End: 2020-11-18 | Stop reason: HOSPADM

## 2020-01-01 RX ORDER — ALBUTEROL SULFATE 90 UG/1
2 AEROSOL, METERED RESPIRATORY (INHALATION) EVERY 6 HOURS PRN
Qty: 18 G | Refills: 6 | Status: SHIPPED | OUTPATIENT
Start: 2020-01-01

## 2020-01-01 RX ORDER — ACETAMINOPHEN 10 MG/ML
1000 INJECTION, SOLUTION INTRAVENOUS EVERY 8 HOURS PRN
Status: DISPENSED | OUTPATIENT
Start: 2020-01-01 | End: 2020-01-01

## 2020-01-01 RX ORDER — ALBUMIN HUMAN 250 G/1000ML
12.5 SOLUTION INTRAVENOUS 3 TIMES DAILY
Status: DISCONTINUED | OUTPATIENT
Start: 2020-01-01 | End: 2020-01-01

## 2020-01-01 RX ORDER — SUCCINYLCHOLINE CHLORIDE 20 MG/ML
100 INJECTION INTRAMUSCULAR; INTRAVENOUS
Status: COMPLETED | OUTPATIENT
Start: 2020-01-01 | End: 2020-01-01

## 2020-01-01 RX ORDER — PRIMIDONE 250 MG/1
500 TABLET ORAL
COMMUNITY
Start: 2020-01-01

## 2020-01-01 RX ORDER — POTASSIUM CHLORIDE 14.9 MG/ML
40 INJECTION INTRAVENOUS EVERY 4 HOURS
Status: COMPLETED | OUTPATIENT
Start: 2020-01-01 | End: 2020-01-01

## 2020-01-01 RX ORDER — MIDAZOLAM HYDROCHLORIDE 1 MG/ML
100 INJECTION, SOLUTION INTRAVENOUS CONTINUOUS
Status: DISCONTINUED | OUTPATIENT
Start: 2020-01-01 | End: 2020-01-01 | Stop reason: CLARIF

## 2020-01-01 RX ORDER — MAGNESIUM SULFATE HEPTAHYDRATE 40 MG/ML
4 INJECTION, SOLUTION INTRAVENOUS
Status: DISCONTINUED | OUTPATIENT
Start: 2020-01-01 | End: 2020-01-01

## 2020-01-01 RX ORDER — FUROSEMIDE 10 MG/ML
40 INJECTION INTRAMUSCULAR; INTRAVENOUS
Status: COMPLETED | OUTPATIENT
Start: 2020-01-01 | End: 2020-01-01

## 2020-01-01 RX ORDER — ETOMIDATE 2 MG/ML
INJECTION INTRAVENOUS
Status: COMPLETED
Start: 2020-01-01 | End: 2020-01-01

## 2020-01-01 RX ORDER — POTASSIUM CHLORIDE 14.9 MG/ML
40 INJECTION INTRAVENOUS
Status: DISCONTINUED | OUTPATIENT
Start: 2020-01-01 | End: 2020-01-01

## 2020-01-01 RX ORDER — MORPHINE SULFATE 4 MG/ML
2 INJECTION, SOLUTION INTRAMUSCULAR; INTRAVENOUS EVERY 4 HOURS PRN
Status: DISCONTINUED | OUTPATIENT
Start: 2020-01-01 | End: 2020-01-01

## 2020-01-01 RX ORDER — POTASSIUM CHLORIDE 14.9 MG/ML
40 INJECTION INTRAVENOUS
Status: COMPLETED | OUTPATIENT
Start: 2020-01-01 | End: 2020-01-01

## 2020-01-01 RX ORDER — MAGNESIUM SULFATE HEPTAHYDRATE 40 MG/ML
2 INJECTION, SOLUTION INTRAVENOUS ONCE
Status: COMPLETED | OUTPATIENT
Start: 2020-01-01 | End: 2020-01-01

## 2020-01-01 RX ORDER — SUCCINYLCHOLINE CHLORIDE 20 MG/ML
INJECTION INTRAMUSCULAR; INTRAVENOUS
Status: COMPLETED
Start: 2020-01-01 | End: 2020-01-01

## 2020-01-01 RX ORDER — POTASSIUM CHLORIDE 29.8 MG/ML
40 INJECTION INTRAVENOUS
Status: DISCONTINUED | OUTPATIENT
Start: 2020-01-01 | End: 2020-01-01

## 2020-01-01 RX ORDER — DOXYCYCLINE HYCLATE 100 MG
100 TABLET ORAL EVERY 12 HOURS
Qty: 20 TABLET | Refills: 0 | Status: SHIPPED | OUTPATIENT
Start: 2020-01-01 | End: 2020-01-01 | Stop reason: ALTCHOICE

## 2020-01-01 RX ORDER — SODIUM CHLORIDE 0.9 % (FLUSH) 0.9 %
10 SYRINGE (ML) INJECTION
Status: DISCONTINUED | OUTPATIENT
Start: 2020-01-01 | End: 2020-01-01

## 2020-01-01 RX ORDER — THIAMINE HYDROCHLORIDE 100 MG/ML
100 INJECTION, SOLUTION INTRAMUSCULAR; INTRAVENOUS DAILY
Status: DISCONTINUED | OUTPATIENT
Start: 2020-01-01 | End: 2020-01-01

## 2020-01-01 RX ORDER — MORPHINE SULFATE 4 MG/ML
1 INJECTION, SOLUTION INTRAMUSCULAR; INTRAVENOUS
Status: DISCONTINUED | OUTPATIENT
Start: 2020-01-01 | End: 2020-11-18 | Stop reason: HOSPADM

## 2020-01-01 RX ORDER — NOREPINEPHRINE BITARTRATE/D5W 8 MG/250ML
0.05 PLASTIC BAG, INJECTION (ML) INTRAVENOUS CONTINUOUS
Status: DISCONTINUED | OUTPATIENT
Start: 2020-01-01 | End: 2020-01-01

## 2020-01-01 RX ORDER — ACETAMINOPHEN 325 MG/1
650 TABLET ORAL EVERY 4 HOURS PRN
Status: DISCONTINUED | OUTPATIENT
Start: 2020-01-01 | End: 2020-11-18 | Stop reason: HOSPADM

## 2020-01-01 RX ORDER — MUPIROCIN 20 MG/G
OINTMENT TOPICAL 2 TIMES DAILY
Status: DISPENSED | OUTPATIENT
Start: 2020-01-01 | End: 2020-01-01

## 2020-01-01 RX ORDER — GABAPENTIN 600 MG/1
600 TABLET ORAL NIGHTLY
Qty: 30 TABLET | Refills: 1 | Status: SHIPPED | OUTPATIENT
Start: 2020-01-01 | End: 2020-01-01

## 2020-01-01 RX ORDER — POTASSIUM CHLORIDE 7.45 MG/ML
40 INJECTION INTRAVENOUS EVERY 4 HOURS
Status: COMPLETED | OUTPATIENT
Start: 2020-01-01 | End: 2020-01-01

## 2020-01-01 RX ORDER — FUROSEMIDE 40 MG/1
40 TABLET ORAL 2 TIMES DAILY
Qty: 10 TABLET | Refills: 0 | Status: SHIPPED | OUTPATIENT
Start: 2020-01-01 | End: 2020-01-01

## 2020-01-01 RX ORDER — POTASSIUM CHLORIDE 7.45 MG/ML
40 INJECTION INTRAVENOUS EVERY 4 HOURS
Status: DISCONTINUED | OUTPATIENT
Start: 2020-01-01 | End: 2020-01-01 | Stop reason: CLARIF

## 2020-01-01 RX ORDER — HYDROCODONE BITARTRATE AND ACETAMINOPHEN 500; 5 MG/1; MG/1
TABLET ORAL
Status: DISCONTINUED | OUTPATIENT
Start: 2020-01-01 | End: 2020-11-18 | Stop reason: HOSPADM

## 2020-01-01 RX ORDER — CIPROFLOXACIN 500 MG/1
500 TABLET ORAL 2 TIMES DAILY
Qty: 28 TABLET | Refills: 0 | Status: SHIPPED | OUTPATIENT
Start: 2020-01-01 | End: 2020-01-01

## 2020-01-01 RX ORDER — POTASSIUM CHLORIDE 14.9 MG/ML
20 INJECTION INTRAVENOUS ONCE
Status: DISCONTINUED | OUTPATIENT
Start: 2020-01-01 | End: 2020-01-01

## 2020-01-01 RX ORDER — FAMOTIDINE 10 MG/ML
20 INJECTION INTRAVENOUS EVERY 12 HOURS
Status: DISCONTINUED | OUTPATIENT
Start: 2020-01-01 | End: 2020-01-01

## 2020-01-01 RX ORDER — MORPHINE SULFATE 4 MG/ML
1 INJECTION, SOLUTION INTRAMUSCULAR; INTRAVENOUS
Status: ACTIVE | OUTPATIENT
Start: 2020-01-01 | End: 2020-01-01

## 2020-01-01 RX ORDER — IPRATROPIUM BROMIDE AND ALBUTEROL SULFATE 2.5; .5 MG/3ML; MG/3ML
SOLUTION RESPIRATORY (INHALATION)
Status: COMPLETED
Start: 2020-01-01 | End: 2020-01-01

## 2020-01-01 RX ORDER — SODIUM CHLORIDE 9 MG/ML
INJECTION, SOLUTION INTRAVENOUS CONTINUOUS
Status: DISCONTINUED | OUTPATIENT
Start: 2020-01-01 | End: 2020-01-01

## 2020-01-01 RX ORDER — MIDAZOLAM HYDROCHLORIDE 1 MG/ML
1 INJECTION, SOLUTION INTRAVENOUS CONTINUOUS
Status: DISCONTINUED | OUTPATIENT
Start: 2020-01-01 | End: 2020-01-01

## 2020-01-01 RX ORDER — POTASSIUM CHLORIDE 29.8 MG/ML
80 INJECTION INTRAVENOUS
Status: DISCONTINUED | OUTPATIENT
Start: 2020-01-01 | End: 2020-01-01

## 2020-01-01 RX ADMIN — ALBUMIN HUMAN 12.5 G: 0.25 SOLUTION INTRAVENOUS at 08:11

## 2020-01-01 RX ADMIN — FAMOTIDINE 20 MG: 10 INJECTION INTRAVENOUS at 08:11

## 2020-01-01 RX ADMIN — POTASSIUM CHLORIDE 40 MEQ: 200 INJECTION, SOLUTION INTRAVENOUS at 09:11

## 2020-01-01 RX ADMIN — FAMOTIDINE 20 MG: 10 INJECTION INTRAVENOUS at 09:11

## 2020-01-01 RX ADMIN — DEXMEDETOMIDINE HYDROCHLORIDE 1.4 MCG/KG/HR: 4 INJECTION, SOLUTION INTRAVENOUS at 07:11

## 2020-01-01 RX ADMIN — DEXMEDETOMIDINE HYDROCHLORIDE 1 MCG/KG/HR: 4 INJECTION, SOLUTION INTRAVENOUS at 01:11

## 2020-01-01 RX ADMIN — Medication 0.5 MCG/KG/MIN: at 04:11

## 2020-01-01 RX ADMIN — DEXMEDETOMIDINE HYDROCHLORIDE 1.4 MCG/KG/HR: 4 INJECTION, SOLUTION INTRAVENOUS at 11:11

## 2020-01-01 RX ADMIN — IPRATROPIUM BROMIDE AND ALBUTEROL SULFATE 3 ML: .5; 3 SOLUTION RESPIRATORY (INHALATION) at 06:11

## 2020-01-01 RX ADMIN — DEXMEDETOMIDINE HYDROCHLORIDE 0.5 MCG/KG/HR: 4 INJECTION, SOLUTION INTRAVENOUS at 01:11

## 2020-01-01 RX ADMIN — ALBUMIN HUMAN 12.5 G: 0.25 SOLUTION INTRAVENOUS at 03:11

## 2020-01-01 RX ADMIN — SODIUM CHLORIDE: 0.9 INJECTION, SOLUTION INTRAVENOUS at 06:11

## 2020-01-01 RX ADMIN — Medication 0.1 MCG/KG/MIN: at 08:11

## 2020-01-01 RX ADMIN — SODIUM CHLORIDE: 0.9 INJECTION, SOLUTION INTRAVENOUS at 02:11

## 2020-01-01 RX ADMIN — CEFTRIAXONE 1 G: 1 INJECTION, SOLUTION INTRAVENOUS at 11:11

## 2020-01-01 RX ADMIN — MIDAZOLAM HYDROCHLORIDE 5 MG/HR: 1 INJECTION, SOLUTION INTRAVENOUS at 12:11

## 2020-01-01 RX ADMIN — MORPHINE SULFATE 1 MG: 4 INJECTION, SOLUTION INTRAMUSCULAR; INTRAVENOUS at 04:11

## 2020-01-01 RX ADMIN — POTASSIUM CHLORIDE 40 MEQ: 7.46 INJECTION, SOLUTION INTRAVENOUS at 03:11

## 2020-01-01 RX ADMIN — DEXMEDETOMIDINE HYDROCHLORIDE 0.2 MCG/KG/HR: 4 INJECTION, SOLUTION INTRAVENOUS at 10:11

## 2020-01-01 RX ADMIN — SUCCINYLCHOLINE CHLORIDE 100 MG: 20 INJECTION, SOLUTION INTRAMUSCULAR; INTRAVENOUS; PARENTERAL at 12:11

## 2020-01-01 RX ADMIN — ALBUMIN HUMAN 12.5 G: 0.25 SOLUTION INTRAVENOUS at 02:11

## 2020-01-01 RX ADMIN — IPRATROPIUM BROMIDE AND ALBUTEROL SULFATE 3 ML: .5; 3 SOLUTION RESPIRATORY (INHALATION) at 07:11

## 2020-01-01 RX ADMIN — MAGNESIUM SULFATE 4 G: 2 INJECTION INTRAVENOUS at 11:11

## 2020-01-01 RX ADMIN — SODIUM CHLORIDE: 0.9 INJECTION, SOLUTION INTRAVENOUS at 11:11

## 2020-01-01 RX ADMIN — SODIUM CHLORIDE: 0.9 INJECTION, SOLUTION INTRAVENOUS at 05:11

## 2020-01-01 RX ADMIN — MUPIROCIN: 20 OINTMENT TOPICAL at 09:11

## 2020-01-01 RX ADMIN — Medication 0.05 MCG/KG/MIN: at 11:11

## 2020-01-01 RX ADMIN — MIDAZOLAM HYDROCHLORIDE 100 ML: 5 INJECTION, SOLUTION INTRAMUSCULAR; INTRAVENOUS at 12:11

## 2020-01-01 RX ADMIN — THIAMINE HYDROCHLORIDE 100 MG: 100 INJECTION, SOLUTION INTRAMUSCULAR; INTRAVENOUS at 08:11

## 2020-01-01 RX ADMIN — DEXMEDETOMIDINE HYDROCHLORIDE 1.4 MCG/KG/HR: 4 INJECTION, SOLUTION INTRAVENOUS at 03:11

## 2020-01-01 RX ADMIN — MIDAZOLAM HYDROCHLORIDE 5 MG/HR: 1 INJECTION, SOLUTION INTRAVENOUS at 01:11

## 2020-01-01 RX ADMIN — CEFTRIAXONE 1 G: 1 INJECTION, SOLUTION INTRAVENOUS at 12:11

## 2020-01-01 RX ADMIN — MIDAZOLAM HYDROCHLORIDE 3 MG/HR: 1 INJECTION, SOLUTION INTRAVENOUS at 06:11

## 2020-01-01 RX ADMIN — IPRATROPIUM BROMIDE AND ALBUTEROL SULFATE 3 ML: .5; 3 SOLUTION RESPIRATORY (INHALATION) at 12:11

## 2020-01-01 RX ADMIN — ALBUMIN HUMAN 12.5 G: 0.25 SOLUTION INTRAVENOUS at 09:11

## 2020-01-01 RX ADMIN — POTASSIUM CHLORIDE 40 MEQ: 200 INJECTION, SOLUTION INTRAVENOUS at 12:11

## 2020-01-01 RX ADMIN — SODIUM CHLORIDE: 0.9 INJECTION, SOLUTION INTRAVENOUS at 03:11

## 2020-01-01 RX ADMIN — ALBUMIN HUMAN 12.5 G: 0.25 SOLUTION INTRAVENOUS at 04:11

## 2020-01-01 RX ADMIN — DEXMEDETOMIDINE HYDROCHLORIDE 1.4 MCG/KG/HR: 4 INJECTION, SOLUTION INTRAVENOUS at 08:11

## 2020-01-01 RX ADMIN — MIDAZOLAM HYDROCHLORIDE 5 MG/HR: 1 INJECTION, SOLUTION INTRAVENOUS at 08:11

## 2020-01-01 RX ADMIN — DEXMEDETOMIDINE HYDROCHLORIDE 1.4 MCG/KG/HR: 4 INJECTION, SOLUTION INTRAVENOUS at 12:11

## 2020-01-01 RX ADMIN — DEXMEDETOMIDINE HYDROCHLORIDE 0.6 MCG/KG/HR: 4 INJECTION, SOLUTION INTRAVENOUS at 12:11

## 2020-01-01 RX ADMIN — ALBUMIN HUMAN 12.5 G: 0.25 SOLUTION INTRAVENOUS at 05:11

## 2020-01-01 RX ADMIN — DEXMEDETOMIDINE HYDROCHLORIDE 1 MCG/KG/HR: 4 INJECTION, SOLUTION INTRAVENOUS at 08:11

## 2020-01-01 RX ADMIN — THIAMINE HYDROCHLORIDE 100 MG: 100 INJECTION, SOLUTION INTRAMUSCULAR; INTRAVENOUS at 09:11

## 2020-01-01 RX ADMIN — SODIUM CHLORIDE 1000 ML: 0.9 INJECTION, SOLUTION INTRAVENOUS at 05:11

## 2020-01-01 RX ADMIN — SODIUM CHLORIDE 1000 ML: 0.9 INJECTION, SOLUTION INTRAVENOUS at 08:11

## 2020-01-01 RX ADMIN — SODIUM CHLORIDE: 0.9 INJECTION, SOLUTION INTRAVENOUS at 12:11

## 2020-01-01 RX ADMIN — POTASSIUM CHLORIDE 40 MEQ: 200 INJECTION, SOLUTION INTRAVENOUS at 10:11

## 2020-01-01 RX ADMIN — ETOMIDATE 20 MG: 2 INJECTION INTRAVENOUS at 12:11

## 2020-01-01 RX ADMIN — Medication 1 MCG/KG/MIN: at 06:11

## 2020-01-01 RX ADMIN — MIDAZOLAM HYDROCHLORIDE 5 MG/HR: 1 INJECTION, SOLUTION INTRAVENOUS at 04:11

## 2020-01-01 RX ADMIN — LORAZEPAM 1 MG: 2 INJECTION, SOLUTION INTRAMUSCULAR; INTRAVENOUS at 12:11

## 2020-01-01 RX ADMIN — DEXMEDETOMIDINE HYDROCHLORIDE 0.4 MCG/KG/HR: 4 INJECTION, SOLUTION INTRAVENOUS at 11:11

## 2020-01-01 RX ADMIN — DEXMEDETOMIDINE HYDROCHLORIDE 0.2 MCG/KG/HR: 4 INJECTION, SOLUTION INTRAVENOUS at 08:11

## 2020-01-01 RX ADMIN — DEXMEDETOMIDINE HYDROCHLORIDE 1.4 MCG/KG/HR: 4 INJECTION, SOLUTION INTRAVENOUS at 02:11

## 2020-01-01 RX ADMIN — CEFTRIAXONE 1 G: 1 INJECTION, SOLUTION INTRAVENOUS at 10:11

## 2020-01-01 RX ADMIN — ACETAMINOPHEN 1000 MG: 10 INJECTION, SOLUTION INTRAVENOUS at 02:11

## 2020-01-01 RX ADMIN — MORPHINE SULFATE 2 MG: 4 INJECTION, SOLUTION INTRAMUSCULAR; INTRAVENOUS at 11:11

## 2020-01-01 RX ADMIN — POTASSIUM CHLORIDE 40 MEQ: 200 INJECTION, SOLUTION INTRAVENOUS at 02:11

## 2020-01-01 RX ADMIN — IPRATROPIUM BROMIDE AND ALBUTEROL SULFATE 3 ML: .5; 2.5 SOLUTION RESPIRATORY (INHALATION) at 03:11

## 2020-01-01 RX ADMIN — SODIUM CHLORIDE: 0.9 INJECTION, SOLUTION INTRAVENOUS at 09:11

## 2020-01-01 RX ADMIN — POTASSIUM CHLORIDE 60 MEQ: 200 INJECTION, SOLUTION INTRAVENOUS at 11:11

## 2020-01-01 RX ADMIN — DEXMEDETOMIDINE HYDROCHLORIDE 1.4 MCG/KG/HR: 4 INJECTION, SOLUTION INTRAVENOUS at 06:11

## 2020-01-01 RX ADMIN — LORAZEPAM 1 MG: 2 INJECTION, SOLUTION INTRAMUSCULAR; INTRAVENOUS at 04:11

## 2020-01-01 RX ADMIN — POTASSIUM CHLORIDE 40 MEQ: 7.46 INJECTION, SOLUTION INTRAVENOUS at 01:11

## 2020-01-01 RX ADMIN — MINERAL OIL AND WHITE PETROLATUM: 150; 830 OINTMENT OPHTHALMIC at 09:11

## 2020-01-01 RX ADMIN — SODIUM CHLORIDE 1632 ML: 9 INJECTION, SOLUTION INTRAVENOUS at 10:11

## 2020-01-01 RX ADMIN — POTASSIUM CHLORIDE 40 MEQ: 200 INJECTION, SOLUTION INTRAVENOUS at 07:11

## 2020-01-01 RX ADMIN — MAGNESIUM SULFATE 2 G: 2 INJECTION INTRAVENOUS at 11:11

## 2020-01-01 RX ADMIN — Medication 0.05 MCG/KG/MIN: at 05:11

## 2020-01-01 RX ADMIN — DEXMEDETOMIDINE HYDROCHLORIDE 1.4 MCG/KG/HR: 4 INJECTION, SOLUTION INTRAVENOUS at 01:11

## 2020-01-01 RX ADMIN — DEXMEDETOMIDINE HYDROCHLORIDE 0.3 MCG/KG/HR: 4 INJECTION, SOLUTION INTRAVENOUS at 12:11

## 2020-01-01 RX ADMIN — DEXMEDETOMIDINE HYDROCHLORIDE 0.2 MCG/KG/HR: 4 INJECTION, SOLUTION INTRAVENOUS at 06:11

## 2020-01-01 RX ADMIN — Medication 1 MCG/KG/MIN: at 02:11

## 2020-01-01 RX ADMIN — DEXMEDETOMIDINE HYDROCHLORIDE 1 MCG/KG/HR: 4 INJECTION, SOLUTION INTRAVENOUS at 03:11

## 2020-01-01 RX ADMIN — ACETAMINOPHEN 650 MG: 325 TABLET ORAL at 08:11

## 2020-01-01 RX ADMIN — IPRATROPIUM BROMIDE AND ALBUTEROL SULFATE 3 ML: .5; 3 SOLUTION RESPIRATORY (INHALATION) at 03:11

## 2020-01-01 RX ADMIN — POTASSIUM CHLORIDE 40 MEQ: 200 INJECTION, SOLUTION INTRAVENOUS at 06:11

## 2020-01-01 RX ADMIN — DEXMEDETOMIDINE HYDROCHLORIDE 1.4 MCG/KG/HR: 4 INJECTION, SOLUTION INTRAVENOUS at 10:11

## 2020-01-01 RX ADMIN — DEXMEDETOMIDINE HYDROCHLORIDE 0.2 MCG/KG/HR: 4 INJECTION, SOLUTION INTRAVENOUS at 05:11

## 2020-01-01 RX ADMIN — MINERAL OIL AND WHITE PETROLATUM: 150; 830 OINTMENT OPHTHALMIC at 08:11

## 2020-01-01 RX ADMIN — Medication 0.05 MCG/KG/MIN: at 12:11

## 2020-01-01 RX ADMIN — DEXMEDETOMIDINE HYDROCHLORIDE 0.8 MCG/KG/HR: 4 INJECTION, SOLUTION INTRAVENOUS at 07:11

## 2020-01-01 RX ADMIN — Medication 0.4 MCG/KG/MIN: at 09:11

## 2020-01-01 RX ADMIN — THIAMINE HYDROCHLORIDE 100 MG: 100 INJECTION, SOLUTION INTRAMUSCULAR; INTRAVENOUS at 12:11

## 2020-01-01 RX ADMIN — Medication 0.75 MCG/KG/MIN: at 08:11

## 2020-01-01 RX ADMIN — MIDAZOLAM HYDROCHLORIDE 5 MG: 5 INJECTION, SOLUTION INTRAMUSCULAR; INTRAVENOUS at 05:11

## 2020-01-01 RX ADMIN — Medication 0.3 MCG/KG/MIN: at 03:11

## 2020-01-01 RX ADMIN — POTASSIUM CHLORIDE 40 MEQ: 200 INJECTION, SOLUTION INTRAVENOUS at 05:11

## 2020-01-01 RX ADMIN — MIDAZOLAM HYDROCHLORIDE 1 MG/HR: 1 INJECTION, SOLUTION INTRAVENOUS at 08:11

## 2020-01-01 RX ADMIN — SODIUM CHLORIDE 1000 ML: 0.9 INJECTION, SOLUTION INTRAVENOUS at 02:11

## 2020-01-01 RX ADMIN — MUPIROCIN: 20 OINTMENT TOPICAL at 08:11

## 2020-01-01 RX ADMIN — MAGNESIUM SULFATE 2 G: 2 INJECTION INTRAVENOUS at 09:11

## 2020-01-01 RX ADMIN — SODIUM CHLORIDE: 0.9 INJECTION, SOLUTION INTRAVENOUS at 10:11

## 2020-01-01 RX ADMIN — POTASSIUM CHLORIDE 60 MEQ: 200 INJECTION, SOLUTION INTRAVENOUS at 07:11

## 2020-01-01 RX ADMIN — DEXMEDETOMIDINE HYDROCHLORIDE 1.4 MCG/KG/HR: 4 INJECTION, SOLUTION INTRAVENOUS at 04:11

## 2020-01-01 RX ADMIN — MIDAZOLAM HYDROCHLORIDE: 5 INJECTION, SOLUTION INTRAMUSCULAR; INTRAVENOUS at 05:11

## 2020-01-01 RX ADMIN — Medication 0.4 MCG/KG/MIN: at 06:11

## 2020-01-01 RX ADMIN — SUCCINYLCHOLINE CHLORIDE 100 MG: 20 INJECTION INTRAMUSCULAR; INTRAVENOUS at 12:11

## 2020-01-01 RX ADMIN — Medication 0.5 MCG/KG/MIN: at 11:11

## 2020-01-01 RX ADMIN — FUROSEMIDE 40 MG: 10 INJECTION, SOLUTION INTRAMUSCULAR; INTRAVENOUS at 01:03

## 2020-01-01 RX ADMIN — POTASSIUM CHLORIDE 40 MEQ: 7.46 INJECTION, SOLUTION INTRAVENOUS at 09:11

## 2020-01-01 RX ADMIN — IPRATROPIUM BROMIDE AND ALBUTEROL SULFATE 3 ML: .5; 3 SOLUTION RESPIRATORY (INHALATION) at 01:11

## 2020-01-01 RX ADMIN — ALBUMIN HUMAN 12.5 G: 0.25 SOLUTION INTRAVENOUS at 10:11

## 2020-01-01 RX ADMIN — Medication 0.4 MCG/KG/MIN: at 10:11

## 2020-01-01 RX ADMIN — SODIUM CHLORIDE 1000 ML: 0.9 INJECTION, SOLUTION INTRAVENOUS at 09:11

## 2020-03-27 NOTE — ED NOTES
Patient relays that he is newly living in the area with his family . His legs have been swelling.

## 2020-03-28 NOTE — ED PROVIDER NOTES
Encounter Date: 3/27/2020       History     Chief Complaint   Patient presents with    Leg Swelling     Patient complaining of bilateral lower leg pain and swelling for several months.    Leg Pain     64-year-old male past medical history significant for essential tremors and who does not routinely follow with primary care physician presents to the ED for evaluation of bilateral lower extremity swelling times months.  Denies claudication, chest pain, dyspnea, diaphoresis, orthopnea.  Denies wound, erythema.        Review of patient's allergies indicates:   Allergen Reactions    Naproxen      Past Medical History:   Diagnosis Date    Tremor      Past Surgical History:   Procedure Laterality Date    ABDOMINAL SURGERY       History reviewed. No pertinent family history.  Social History     Tobacco Use    Smoking status: Current Every Day Smoker   Substance Use Topics    Alcohol use: Yes    Drug use: Not Currently     Review of Systems   Constitutional: Negative for appetite change, chills, diaphoresis, fatigue and fever.   HENT: Negative for congestion, ear pain, rhinorrhea, sinus pressure, sinus pain, sore throat and tinnitus.    Eyes: Negative for photophobia and visual disturbance.   Respiratory: Negative for cough, chest tightness, shortness of breath and wheezing.    Cardiovascular: Positive for leg swelling. Negative for chest pain and palpitations.   Gastrointestinal: Negative for abdominal pain, constipation, diarrhea, nausea and vomiting.   Endocrine: Negative for cold intolerance, heat intolerance, polydipsia, polyphagia and polyuria.   Genitourinary: Negative for decreased urine volume, difficulty urinating, dysuria, flank pain, frequency, hematuria and urgency.   Musculoskeletal: Negative for arthralgias, back pain, gait problem, joint swelling, myalgias, neck pain and neck stiffness.   Skin: Negative for color change, pallor, rash and wound.   Allergic/Immunologic: Negative for immunocompromised  state.   Neurological: Negative for dizziness, syncope, weakness, light-headedness, numbness and headaches.   Hematological: Negative for adenopathy. Does not bruise/bleed easily.   Psychiatric/Behavioral: Negative for decreased concentration, dysphoric mood and sleep disturbance. The patient is not nervous/anxious.    All other systems reviewed and are negative.      Physical Exam     Initial Vitals [03/27/20 1318]   BP Pulse Resp Temp SpO2   110/80 94 20 98.3 °F (36.8 °C) 96 %      MAP       --         Physical Exam    Nursing note and vitals reviewed.  Constitutional: He appears well-developed and well-nourished. He is not diaphoretic. No distress.   HENT:   Head: Normocephalic and atraumatic.   Right Ear: External ear normal.   Left Ear: External ear normal.   Nose: Nose normal.   Mouth/Throat: Oropharynx is clear and moist.   Eyes: Conjunctivae are normal. Pupils are equal, round, and reactive to light. No scleral icterus.   Neck: Normal range of motion. Neck supple. No JVD present.   Cardiovascular: Normal rate, regular rhythm, normal heart sounds and intact distal pulses.   Pulmonary/Chest: Breath sounds normal. No respiratory distress. He has no wheezes. He has no rhonchi. He has no rales. He exhibits no tenderness.   Abdominal: Soft. Bowel sounds are normal. He exhibits no distension. There is no tenderness. There is no rebound and no guarding.   Musculoskeletal: Normal range of motion. He exhibits edema. He exhibits no tenderness.   Lymphadenopathy:     He has no cervical adenopathy.   Neurological: He is alert and oriented to person, place, and time. GCS score is 15. GCS eye subscore is 4. GCS verbal subscore is 5. GCS motor subscore is 6.   Skin: Skin is warm and dry. Capillary refill takes less than 2 seconds. No rash and no abscess noted. No erythema. No pallor.   Psychiatric: He has a normal mood and affect. His behavior is normal. Judgment and thought content normal.         ED Course    Procedures  Labs Reviewed - No data to display       Imaging Results    None          Medical Decision Making:   Differential Diagnosis:   Peripheral edema  ED Management:  Patient is hemodynamically stable with no complaints of shortness of pain or claudication.  No evidence of cellulitis.  Believe this to be very mild peripheral edema and was started on Lasix.  He has also been provided a referral for local family practice.                   ED Course as of Mar 28 0756   Fri Mar 27, 2020   1315 C/o BLE pain/swelling (R>L) for several months; states he was seen by [place] in [facility] x4 weeks ago & recommended to be seen by [facility], no eval since then    I have performed the rapid medical exam (RME) portion of the medical screening exam (MSE) & have determined that this patient requires further evaluation and/or testing to determine if an emergent medical condition exists     [DH]      ED Course User Index  [DH] Griffin Arredondo NP                Clinical Impression:       ICD-10-CM ICD-9-CM   1. Mild peripheral edema R60.9 782.3         Disposition:   Disposition: Discharged  Condition: Stable     ED Disposition Condition    Discharge Stable        ED Prescriptions     Medication Sig Dispense Start Date End Date Auth. Provider    furosemide (LASIX) 40 MG tablet Take 1 tablet (40 mg total) by mouth 2 (two) times daily. for 5 days 10 tablet 3/27/2020 4/1/2020 Nayeli Kessler MD        Follow-up Information    None                                    Nayeli Kessler MD  03/28/20 0886

## 2020-06-22 NOTE — TELEPHONE ENCOUNTER
----- Message from Nikki Miles sent at 6/22/2020 10:24 AM CDT -----  Type:  Patient Returning Call    Who Called:  Chele Lan - daughter  Who Left Message for Patient:  did not know  Does the patient know what this is regarding?:  scheduling, needs to know cost of wound care  Best Call Back Number:  [phone]  Additional Information:

## 2020-06-22 NOTE — TELEPHONE ENCOUNTER
----- Message from Ashley Mahmood MA sent at 6/22/2020 10:14 AM CDT -----  Regarding: Rx Refill  Type:  RX Refill Request    Who Called:  Chele  Refill or New Rx:  refill  RX Name and Strength:  albuterol (PROAIR HFA) 90 mcg/actuation inhaler   4/3/2020    Sig - Route: Inhale 2 puffs into the lungs. - Inhalation   How is the patient currently taking it? (ex. 1XDay):  See Above  Is this a 30 day or 90 day RX:  90  Preferred Pharmacy with phone number:    [id number] - [place] [address] [address]  [address] [address]  Phone: [phone] Fax: [phone]  Local or Mail Order:  local  Ordering Provider:    Best Call Back Number:    Additional Information:  patient is out of medication

## 2020-06-24 PROBLEM — S81.801A WOUND OF RIGHT LEG: Status: ACTIVE | Noted: 2020-01-01

## 2020-06-24 PROBLEM — I73.9 PERIPHERAL VASCULAR DISEASE: Status: ACTIVE | Noted: 2020-01-01

## 2020-06-24 PROBLEM — I87.2 VENOUS STASIS DERMATITIS OF BOTH LOWER EXTREMITIES: Status: ACTIVE | Noted: 2020-01-01

## 2020-06-24 NOTE — LETTER
June 27, 2020      Janey Garcia MD  [address] [place] [address]           [facility]  [address] [facility] [address] [facility] [place] [address]  Phone: [phone]  Fax: [phone]          Patient: David Mcintosh   MR Number: [id number]   YOB: 1955   Date of Visit: 6/24/2020       Dear Dr. Janey Garcia:    Thank you for referring David Mcintosh to me for evaluation. Attached you will find relevant portions of my assessment and plan of care.    If you have questions, please do not hesitate to call me. I look forward to following David Mcintosh along with you.    Sincerely,    Jason Maya, CARMEN    Enclosure  CC:  No Recipients    If you would like to receive this communication electronically, please contact [email or web address] or [phone] to request more information on [employer] access.    For providers and/or their staff who would like to refer a patient to [facility], please contact us through our one-stop-shop provider referral line, [facility], at [phone].    If you feel you have received this communication in error or would no longer like to receive these types of communications, please e-mail [email or web address]

## 2020-06-28 NOTE — PROGRESS NOTES
Subjective:       Patient ID: David Mcintosh is a 64 y.o. male.    Chief Complaint: Foot Problem and Foot Ulcer   Patient presents as a new patient evaluation as referred by his primary care provider for wounds bilateral lower extremities that have been breaking down showing signs of improvement than breaking down again currently the right leg is worse than the left patient states this has been going on for about 30 days.  Patient experiences left leg pain he states the leg is very hard and causes him discomfort he has had lung cancer previously.  Patient has just finished doxycycline.    Past Medical History:   Diagnosis Date    Tremor      Past Surgical History:   Procedure Laterality Date    ABDOMINAL SURGERY       Family History   Family history unknown: Yes     Social History     Socioeconomic History    Marital status:      Spouse name: Not on file    Number of children: Not on file    Years of education: Not on file    Highest education level: Not on file   Occupational History    Not on file   Social Needs    Financial resource strain: Not on file    Food insecurity     Worry: Not on file     Inability: Not on file    Transportation needs     Medical: Not on file     Non-medical: Not on file   Tobacco Use    Smoking status: Current Every Day Smoker     Years: 50.00     Types: Cigarettes    Smokeless tobacco: Never Used   Substance and Sexual Activity    Alcohol use: Yes     Frequency: 4 or more times a week     Drinks per session: 10 or more     Binge frequency: Daily or almost daily    Drug use: Not Currently    Sexual activity: Not Currently   Lifestyle    Physical activity     Days per week: Not on file     Minutes per session: Not on file    Stress: Not on file   Relationships    Social connections     Talks on phone: Not on file     Gets together: Not on file     Attends Buddhism service: Not on file     Active member of club or organization: Not on file     Attends meetings of  "clubs or organizations: Not on file     Relationship status: Not on file   Other Topics Concern    Not on file   Social History Narrative    Not on file       Current Outpatient Medications   Medication Sig Dispense Refill    albuterol (PROAIR HFA) 90 mcg/actuation inhaler Inhale 2 puffs into the lungs every 6 (six) hours as needed for Wheezing or Shortness of Breath. 18 g 6    furosemide (LASIX) 40 MG tablet Take 1 tablet (40 mg total) by mouth once daily. for 7 days 7 tablet 0    mupirocin calcium 2% (BACTROBAN) 2 % cream Apply topically 2 (two) times daily. 15 g 0    primidone (MYSOLINE) 250 MG Tab Take 500 mg by mouth.      ciprofloxacin HCl (CIPRO) 500 MG tablet Take 1 tablet (500 mg total) by mouth 2 (two) times daily. for 14 days 28 tablet 0    silver sulfADIAZINE 1% (SILVADENE) 1 % cream Apply topically once daily. 50 g 2     No current facility-administered medications for this visit.      Review of patient's allergies indicates:   Allergen Reactions    Naproxen     Nsaids (non-steroidal anti-inflammatory drug)        Review of Systems   Musculoskeletal: Positive for arthralgias, gait problem and joint swelling.   All other systems reviewed and are negative.      Objective:      Vitals:    06/24/20 1156   BP: 119/75   Pulse: 95   Temp: 98.2 °F (36.8 °C)   Weight: 65.8 kg (145 lb)   Height: 6' 1" (1.854 m)     Physical Exam  Vitals signs and nursing note reviewed.   Constitutional:       Appearance: He is ill-appearing.   Cardiovascular:      Pulses:           Dorsalis pedis pulses are 1+ on the right side and 1+ on the left side.        Posterior tibial pulses are 0 on the right side and 0 on the left side.   Pulmonary:      Effort: Respiratory distress present.   Musculoskeletal:      Right lower leg: Edema present.      Left lower leg: Edema present.        Legs:       Right foot: Decreased range of motion.      Left foot: Decreased range of motion.   Feet:      Right foot:      Protective " Sensation: 2 sites tested. 2 sites sensed.      Skin integrity: Ulcer, skin breakdown and erythema present.      Left foot:      Protective Sensation: 2 sites tested. 2 sites sensed.      Skin integrity: Ulcer present.   Skin:     Capillary Refill: Capillary refill takes more than 3 seconds.      Findings: Erythema present.   Neurological:      General: No focal deficit present.      Mental Status: He is alert.   Psychiatric:         Mood and Affect: Mood normal.         Behavior: Behavior normal.         Thought Content: Thought content normal.                       Contains abnormal data CULTURE, AEROBIC  (SPECIFY SOURCE)  Order: [id number]  Status:  Final result   Visible to patient:  No (not released) Next appt:  07/08/2020 at 02:00 PM in Podiatry (Jason Maya DPM) Dx:  Wound of right lower extremity, initi...  Specimen Information: Leg, Right; Skin        Component 10d ago   Aerobic Bacterial Culture Abnormal   ENTEROBACTER CLOACAE   Many     Resulting Agency [facility]   Susceptibility     Enterobacter cloacae     CULTURE, AEROBIC  (SPECIFY SOURCE)     Cefepime <=2 mcg/mL Sensitive     Ceftriaxone <=1 mcg/mL Sensitive     Ciprofloxacin <=1 mcg/mL Sensitive     Ertapenem <=0.5 mcg/mL Sensitive     Gentamicin <=4 mcg/mL Sensitive     Levofloxacin <=2 mcg/mL Sensitive     Meropenem <=1 mcg/mL Sensitive     Piperacillin/Tazo <=16 mcg/mL Sensitive     Tetracycline <=4 mcg/mL Sensitive     Tobramycin <=4 mcg/mL Sensitive     Trimeth/Sulfa <=2/38 mcg/mL Sensitive            Linear View         Specimen Collected: 06/17/20 17:08 Last Resulted: 06/19/20 10:38                  Assessment:       1. Peripheral vascular disease    2. Wound of right lower extremity, initial encounter    3. Venous stasis dermatitis of both lower extremities        Plan:         Patient presents as a new patient evaluation as referred by his primary care provider for wounds bilateral lower extremities that have been breaking down showing  signs of improvement than breaking down again currently the right leg is worse than the left patient states this has been going on for about 30 days.  Patient experiences left leg pain he states the leg is very hard and causes him discomfort he has had lung cancer previously.  Patient has just finished doxycycline.  Patient advised I am going to recommend starting him on Cipro times 14 days his culture and sensitivity was positive for Enterobacter.  Patient's family member was present today I have advised them this area especially on the outside of the right lower extremity needs to be cleaned every day with Dakin solution dried off 1 day a dry dressing is applied the next day Silvadene cream is going to be applied to the area this will help to provide moisture to the area to address any superficial bacteria as well as providing the necessary elements for healing a well padded protective dressing is to be applied over the area.  Patient did not have findings consistent with DVT while he had swelling and increased skin temperature in the calf tenderness was limited as was the overall edema limited I believe that this is related to cellulitis and the area of skin breakdown.  Patient does have significant peripheral vascular disease I explained to the patient and his family member the need for them to get this under control and get these areas healed he is it is significantly elevated risk for complication due to his poor circulation and his history of cancer.  The patient's left lower extremity you will also be cleaned with Dakin solution every day while it does not need to be dressed it does need to be moisturized with a good moisturizing cream they do not need to use the Silvadene cream on the left leg.  Plan follow-up will be 2 weeks any increased redness swelling pain discomfort drainage or odor they are to contact us immediately.  Total face-to-face time including discussion evaluation treatment wound care today  equaled 35 min.This note was created using [employer] voice recognition software that occasionally misinterpreted phrases or words.

## 2020-07-08 NOTE — PROGRESS NOTES
Requested updates within Care Everywhere.  Patient's chart was reviewed for overdue KYLE topics.  Immunizations reconciled.

## 2020-07-12 NOTE — PROGRESS NOTES
Subjective:       Patient ID: David Mcintosh is a 64 y.o. male.    Chief Complaint: Follow-up and Foot Problem      Past Medical History:   Diagnosis Date    Tremor      Past Surgical History:   Procedure Laterality Date    ABDOMINAL SURGERY       Family History   Family history unknown: Yes     Social History     Socioeconomic History    Marital status:      Spouse name: Not on file    Number of children: Not on file    Years of education: Not on file    Highest education level: Not on file   Occupational History    Not on file   Social Needs    Financial resource strain: Not on file    Food insecurity     Worry: Not on file     Inability: Not on file    Transportation needs     Medical: Not on file     Non-medical: Not on file   Tobacco Use    Smoking status: Current Every Day Smoker     Years: 50.00     Types: Cigarettes    Smokeless tobacco: Never Used   Substance and Sexual Activity    Alcohol use: Yes     Frequency: 4 or more times a week     Drinks per session: 10 or more     Binge frequency: Daily or almost daily    Drug use: Not Currently    Sexual activity: Not Currently   Lifestyle    Physical activity     Days per week: Not on file     Minutes per session: Not on file    Stress: Not on file   Relationships    Social connections     Talks on phone: Not on file     Gets together: Not on file     Attends Muslim service: Not on file     Active member of club or organization: Not on file     Attends meetings of clubs or organizations: Not on file     Relationship status: Not on file   Other Topics Concern    Not on file   Social History Narrative    Not on file       Current Outpatient Medications   Medication Sig Dispense Refill    albuterol (PROAIR HFA) 90 mcg/actuation inhaler Inhale 2 puffs into the lungs every 6 (six) hours as needed for Wheezing or Shortness of Breath. 18 g 6    furosemide (LASIX) 40 MG tablet Take 1 tablet (40 mg total) by mouth once daily. for 7 days 7  "tablet 0    mupirocin calcium 2% (BACTROBAN) 2 % cream Apply topically 2 (two) times daily. 15 g 0    primidone (MYSOLINE) 250 MG Tab Take 500 mg by mouth.      silver sulfADIAZINE 1% (SILVADENE) 1 % cream Apply topically once daily. 50 g 2     No current facility-administered medications for this visit.      Review of patient's allergies indicates:   Allergen Reactions    Naproxen     Nsaids (non-steroidal anti-inflammatory drug)        Review of Systems   Musculoskeletal: Positive for arthralgias, gait problem and joint swelling.   All other systems reviewed and are negative.      Objective:      Vitals:    07/08/20 1425   BP: 124/81   Pulse: 102   Temp: 97.8 °F (36.6 °C)   Weight: 65.8 kg (145 lb)   Height: 6' 1" (1.854 m)     Physical Exam  Vitals signs and nursing note reviewed.   Constitutional:       Appearance: He is ill-appearing.   Cardiovascular:      Pulses:           Dorsalis pedis pulses are 1+ on the right side and 1+ on the left side.        Posterior tibial pulses are 0 on the right side and 0 on the left side.   Pulmonary:      Effort: Respiratory distress present.   Musculoskeletal:      Right lower leg: Edema present.      Left lower leg: Edema present.        Legs:       Right foot: Decreased range of motion.      Left foot: Decreased range of motion.   Feet:      Right foot:      Protective Sensation: 2 sites tested. 2 sites sensed.      Skin integrity: Ulcer, skin breakdown and erythema present.      Left foot:      Protective Sensation: 2 sites tested. 2 sites sensed.      Skin integrity: Ulcer present.   Skin:     Capillary Refill: Capillary refill takes more than 3 seconds.      Findings: Erythema present.   Neurological:      General: No focal deficit present.      Mental Status: He is alert.   Psychiatric:         Mood and Affect: Mood normal.         Behavior: Behavior normal.         Thought Content: Thought content normal.                                                "   Assessment:       1. Peripheral vascular disease    2. Venous stasis dermatitis of both lower extremities    3. Wound of right lower extremity, subsequent encounter        Plan:         Patient presents as an established patient evaluation as referred by his primary care provider for wounds bilateral lower extremities that have been breaking down showing signs of improvement than breaking down again currently the right leg is worse than the left patient states this has been going on for about 30 days.  Patient is present with a family member today who states she thinks his legs are looking great on evaluation patient still has significant vascular compromise in both lower extremities this puts him at risk for complication his feet really need to be checked daily however the wounds on both lower extremities look a lot better the patient's left leg is so much better hydrated and moisturize in comparison to previous evaluation in the areas of previously noted skin breakdown on the right have all new healthy pink skin and tissue there is swelling in the right foot and leg overall it is dramatically better.  Obviously the wound Care the antibiotics have helped a lot patient has a few days left of Cipro he needs to finish this up per the culture and sensitivity I want them to keep on putting moisturizing lotion on the left leg as directed we did apply an Unna boot to the right to help push some of the swelling out of the right lower extremity his family member stated that she would leave it on for 3-5 days as instructed and a new Unna boot can be applied at that time.  Patient was advised we need to keep the swelling out of both lower extremities to prevent these ulcerations in the future patient was in understanding and agreement with this follow-up will be as needed any problems questions or concerns he is to contact us immediately the patient the patient's family member were very pleased that the ulcerations were  completely healed in only 2 weeks.  Total face-to-face time including discussion evaluation treatment wound care and Unna boot application equaled 25 min.  This note was created using [employer] voice recognition software that occasionally misinterpreted phrases or words.

## 2020-07-14 NOTE — PROGRESS NOTES
Pre-visit Chart review notification  07/14/20  EFAX SENT TO [facility] FOR MOST RECENT COLONOSCOPY RESULTS  (P) [phone]  (F) [phone]

## 2020-07-14 NOTE — LETTER
July 14, 2020    David Mcintosh  [address] [place] [address]             [facility] [address] [address]  Phone: [phone] Dear Kirby [facility] is committed to your overall health and would like to ensure that you are up to date on your recommended test and/or procedures.   Janey Garcia MD  has found that your chart shows you may be due for the following:    Health Maintenance Due   Topic Date Due    TETANUS VACCINE  09/16/1973    Pneumococcal Vaccine (Medium Risk) (1 of 1 - PPSV23) 09/16/1974    Shingles Vaccine (1 of 2) 09/16/2005    Colorectal Cancer Screening  09/16/2005     If you have had any of the above done at another facility, please let us know so that we may obtain copies from that facility.  If you have a copy of these records, please provide a copy for us to scan into your chart.  You are welcome to request that the report be faxed to us at  ([phone]).     Otherwise, please schedule these appointments at your earliest convenience by calling [phone] or going to [email or web address].    If you have an upcoming scheduled appointment for the item above, please disregard this letter.    Sincerely,  Your [facility]  MD Marva Navarro L.P.N. Clinical Care Coordinator  [address] [place] [address], [place] [address] [phone]

## 2020-07-14 NOTE — LETTER
FAX      AUTHORIZATION FOR RELEASE OF   CONFIDENTIAL INFORMATION        [facility]      We are seeing David Mcintosh, date of birth 1955, in the clinic at [facility]. Janey Garcia MD is the patient's PCP. David Mcintosh has an outstanding lab/procedure at the time we reviewed her chart. In order to help keep her health information updated, she has authorized us to request the following medical record(s):        (  )  MAMMOGRAM                                      ( X )  COLONOSCOPY      (  )  PAP SMEAR                                          (  )  MOST RECENT LAB RESULTS     (  )  DEXA SCAN                                          (  )  DIABETIC EYE EXAM            (  )  DIABETIC FOOT EXAM                        (  )  MOST RECENT A1c, LIPID, &          URINE MICRO-ALBUMIN     (  )  OUTSIDE IMMUNIZATIONS                 (  )  _______________        Please fax records to [facility]  [phone]     If you have any questions, please contact Marva at [phone].      Marva Smith L.P.N. Clinical Care Coordinator  [address] [place] [id number] [phone]

## 2020-07-15 NOTE — TELEPHONE ENCOUNTER
----- Message from Rosalia Aguilar sent at 7/15/2020 12:49 PM CDT -----  Regarding: advice  Contact: pt's dawson Elaine  Patient's daughter Chele Lan called they unwrapped his leg yesterday and today   He woke up with boils and blisters all over his  right leg.   Please call back to advise      Call back [phone]

## 2020-07-15 NOTE — TELEPHONE ENCOUNTER
"Spoke with daughter stated dressing was removed yesterday and when removing noted blisters are back again. One on the back of the calf is about 5"X5". No pain or redness stated.   "

## 2020-07-15 NOTE — TELEPHONE ENCOUNTER
Spoke with daughter and reviewed instructions, understanding verbalized. Appointment for f/u on Monday 7/208/2020.

## 2020-07-25 NOTE — PROGRESS NOTES
Subjective:       Patient ID: David Mcintosh is a 64 y.o. male.    Chief Complaint: Follow-up and Foot Problem   Patient presents as a new patient evaluation as referred by his primary care provider for wounds bilateral lower extremities that have been breaking down showing signs of improvement than breaking down again currently the right leg is worse than the left patient states this has been going on for about 30 days.  Patient experiences left leg pain he states the leg is very hard and causes him discomfort he has had lung cancer previously.  Patient is presenting with a family member today because the skin on his right lower extremity started to peel off after they removed the Unna boot.    Past Medical History:   Diagnosis Date    Tremor      Past Surgical History:   Procedure Laterality Date    ABDOMINAL SURGERY       Family History   Family history unknown: Yes     Social History     Socioeconomic History    Marital status:      Spouse name: Not on file    Number of children: Not on file    Years of education: Not on file    Highest education level: Not on file   Occupational History    Not on file   Social Needs    Financial resource strain: Not on file    Food insecurity     Worry: Not on file     Inability: Not on file    Transportation needs     Medical: Not on file     Non-medical: Not on file   Tobacco Use    Smoking status: Current Every Day Smoker     Years: 50.00     Types: Cigarettes    Smokeless tobacco: Never Used   Substance and Sexual Activity    Alcohol use: Yes     Frequency: 4 or more times a week     Drinks per session: 10 or more     Binge frequency: Daily or almost daily    Drug use: Not Currently    Sexual activity: Not Currently   Lifestyle    Physical activity     Days per week: Not on file     Minutes per session: Not on file    Stress: Not on file   Relationships    Social connections     Talks on phone: Not on file     Gets together: Not on file     Attends  "Gnosticist service: Not on file     Active member of club or organization: Not on file     Attends meetings of clubs or organizations: Not on file     Relationship status: Not on file   Other Topics Concern    Not on file   Social History Narrative    Not on file       Current Outpatient Medications   Medication Sig Dispense Refill    albuterol (PROAIR HFA) 90 mcg/actuation inhaler Inhale 2 puffs into the lungs every 6 (six) hours as needed for Wheezing or Shortness of Breath. 18 g 6    mupirocin calcium 2% (BACTROBAN) 2 % cream Apply topically 2 (two) times daily. 15 g 0    primidone (MYSOLINE) 250 MG Tab Take 500 mg by mouth.      silver sulfADIAZINE 1% (SILVADENE) 1 % cream Apply topically once daily. 50 g 2    furosemide (LASIX) 40 MG tablet Take 1 tablet (40 mg total) by mouth once daily. for 7 days (Patient not taking: Reported on 7/20/2020) 7 tablet 0     No current facility-administered medications for this visit.      Review of patient's allergies indicates:   Allergen Reactions    Naproxen     Nsaids (non-steroidal anti-inflammatory drug)        Review of Systems   Musculoskeletal: Positive for arthralgias, gait problem and joint swelling.   All other systems reviewed and are negative.      Objective:      Vitals:    07/20/20 1451   BP: 116/67   Pulse: 77   Temp: 98 °F (36.7 °C)   Weight: 65.8 kg (145 lb)   Height: 6' 1" (1.854 m)     Physical Exam  Vitals signs and nursing note reviewed.   Constitutional:       Appearance: He is ill-appearing.   Cardiovascular:      Pulses:           Dorsalis pedis pulses are 1+ on the right side and 1+ on the left side.        Posterior tibial pulses are 0 on the right side and 0 on the left side.   Pulmonary:      Effort: Respiratory distress present.   Musculoskeletal:      Right lower leg: Edema present.      Left lower leg: Edema present.        Legs:       Right foot: Decreased range of motion.      Left foot: Decreased range of motion.   Feet:      Right " foot:      Protective Sensation: 2 sites tested. 2 sites sensed.      Skin integrity: Ulcer, skin breakdown and erythema present.      Left foot:      Protective Sensation: 2 sites tested. 2 sites sensed.      Skin integrity: Ulcer present.   Skin:     Capillary Refill: Capillary refill takes more than 3 seconds.      Findings: Erythema present.   Neurological:      General: No focal deficit present.      Mental Status: He is alert.   Psychiatric:         Mood and Affect: Mood normal.         Behavior: Behavior normal.         Thought Content: Thought content normal.                                              Assessment:       1. Peripheral vascular disease    2. Venous stasis dermatitis of both lower extremities    3. Wound of right lower extremity, subsequent encounter        Plan:         patient presents today for followup his family members present today they state that when they remove the Unna boot from his right lower extremity in the areas of skin breakdown a lot of the skin started to peel off was very red irritated.  At this time the patient currently does not have insurance so ordering home health is not an option for this patient I have advised his family member that is present today they are going to need to continue to change the dressings on the right lower extremity we did gently scrub the entire right lower extremity with a P cm X scrub removing all of the zinc oxide peeling dead skin from the area this revealed some very erythematous irritated skin but did not show signs of infection.  The areas were thoroughly cleaned Xeroform was applied as this area was covered with a light dressing this is to be changed every 72 hr we showed the family member how to change the dressings and basically this presents as almost a burn type situation with the patient's irritation so the Xeroform should help to settle this down and get the area healed I will see him for follow-up in 1 week.  Patient and patient's  family member advised to contact us immediately with any changes to the area.  Total face-to-face time including discussion evaluation treatment wound care today equaled 35 min.This note was created using [employer] voice recognition software that occasionally misinterpreted phrases or words.

## 2020-08-02 NOTE — PROGRESS NOTES
Subjective:       Patient ID: David Mcintosh is a 64 y.o. male.    Chief Complaint: Follow-up, Foot Problem, Foot Pain, and Foot Ulcer   Patient presents as a new patient evaluation as referred by his primary care provider for wounds bilateral lower extremities that have been breaking down showing signs of improvement than breaking down again currently the right leg is worse than the left patient states this has been going on for about 30 days.  Patient experiences left leg pain he states the leg is very hard and causes him discomfort he has had lung cancer previously.  Patient is presenting with a family member today because the skin on his right lower extremity started to peel off after they removed the Unna boot.    Past Medical History:   Diagnosis Date    Tremor      Past Surgical History:   Procedure Laterality Date    ABDOMINAL SURGERY       Family History   Family history unknown: Yes     Social History     Socioeconomic History    Marital status:      Spouse name: Not on file    Number of children: Not on file    Years of education: Not on file    Highest education level: Not on file   Occupational History    Not on file   Social Needs    Financial resource strain: Not on file    Food insecurity     Worry: Not on file     Inability: Not on file    Transportation needs     Medical: Not on file     Non-medical: Not on file   Tobacco Use    Smoking status: Current Every Day Smoker     Years: 50.00     Types: Cigarettes    Smokeless tobacco: Never Used   Substance and Sexual Activity    Alcohol use: Yes     Frequency: 4 or more times a week     Drinks per session: 10 or more     Binge frequency: Daily or almost daily    Drug use: Not Currently    Sexual activity: Not Currently   Lifestyle    Physical activity     Days per week: Not on file     Minutes per session: Not on file    Stress: Not on file   Relationships    Social connections     Talks on phone: Not on file     Gets together:  "Not on file     Attends Episcopalian service: Not on file     Active member of club or organization: Not on file     Attends meetings of clubs or organizations: Not on file     Relationship status: Not on file   Other Topics Concern    Not on file   Social History Narrative    Not on file       Current Outpatient Medications   Medication Sig Dispense Refill    albuterol (PROAIR HFA) 90 mcg/actuation inhaler Inhale 2 puffs into the lungs every 6 (six) hours as needed for Wheezing or Shortness of Breath. 18 g 6    mupirocin calcium 2% (BACTROBAN) 2 % cream Apply topically 2 (two) times daily. 15 g 0    primidone (MYSOLINE) 250 MG Tab Take 500 mg by mouth.      silver sulfADIAZINE 1% (SILVADENE) 1 % cream Apply topically once daily. 50 g 2    furosemide (LASIX) 40 MG tablet Take 1 tablet (40 mg total) by mouth once daily. for 7 days (Patient not taking: Reported on 7/20/2020) 7 tablet 0    gabapentin (NEURONTIN) 600 MG tablet Take 1 tablet (600 mg total) by mouth nightly. 30 tablet 1     No current facility-administered medications for this visit.      Review of patient's allergies indicates:   Allergen Reactions    Naproxen     Nsaids (non-steroidal anti-inflammatory drug)        Review of Systems   Musculoskeletal: Positive for arthralgias, gait problem and joint swelling.   All other systems reviewed and are negative.      Objective:      Vitals:    07/27/20 1437   BP: 126/83   Pulse: 92   Temp: 98.7 °F (37.1 °C)   Weight: 65.8 kg (145 lb)   Height: 6' 1" (1.854 m)     Physical Exam  Vitals signs and nursing note reviewed.   Constitutional:       Appearance: He is ill-appearing.   Cardiovascular:      Pulses:           Dorsalis pedis pulses are 1+ on the right side and 1+ on the left side.        Posterior tibial pulses are 0 on the right side and 0 on the left side.   Pulmonary:      Effort: Respiratory distress present.   Musculoskeletal:      Right lower leg: Edema present.      Left lower leg: Edema " present.        Legs:       Right foot: Decreased range of motion.      Left foot: Decreased range of motion.   Feet:      Right foot:      Protective Sensation: 2 sites tested. 2 sites sensed.      Skin integrity: Ulcer, skin breakdown and erythema present.      Left foot:      Protective Sensation: 2 sites tested. 2 sites sensed.      Skin integrity: Ulcer present.   Skin:     Capillary Refill: Capillary refill takes more than 3 seconds.      Findings: Erythema present.   Neurological:      General: No focal deficit present.      Mental Status: He is alert.   Psychiatric:         Mood and Affect: Mood normal.         Behavior: Behavior normal.         Thought Content: Thought content normal.                                                              Assessment:       1. Peripheral vascular disease    2. Venous stasis dermatitis of both lower extremities    3. Wound of right lower extremity, subsequent encounter        Plan:         patient presents today for followup his family members present today they state that when they remove the Unna boot from his right lower extremity in the areas of skin breakdown a lot of the skin started to peel off was very red irritated.  At this time the patient currently does not have insurance so ordering home health is not an option for this patient I have advised his family member that is present today they are going to need to continue to change the dressings on the right lower extremity.  I have discontinued the scrubbing and the Dakin solution to the patient's right leg the areas where that had broken down previously are now very erythematous with new granular tissue that is formed in the area no active signs of infection noted but I have advised him we need to try to get the settle down so that they will be suit thing and not caused the patient discomfort.  Xeroform will be applied to all of the areas every other day his family was dispensed supplies so that they will be  able to do this with a well-padded dressing I made sure they understood no more Dakin solution applied to the area.  I do plan to follow up with the patient in 1 week I expect to see significant improvement at that time.  I did give the patient a prescription for gabapentin 400 mg at bedtime he is having a lot of nerve related pain and discomfort in his legs and feet when he is trying to go to sleep at night will see how the patient is doing with this at his follow-up in 1 week.  Patient currently does not have insurance which makes it difficult to get the things that the patient needs like wound care care supplies and home health the person present with him today stated they are hoping that he will have it sometime in August or September.  Patient and patient's family member advised to contact us immediately with any changes to the area.  Total face-to-face time including discussion evaluation treatment wound care today equaled 25 min.This note was created using [employer] voice recognition software that occasionally misinterpreted phrases or words.
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
## 2020-08-03 PROBLEM — S99.911A INJURY OF RIGHT ANKLE: Status: ACTIVE | Noted: 2020-01-01

## 2020-08-08 NOTE — PROGRESS NOTES
Subjective:       Patient ID: David Mcintosh is a 64 y.o. male.    Chief Complaint: Follow-up, Foot Pain, Foot Problem, Foot Injury, and Ankle Pain   Patient presents as a new patient evaluation as referred by his primary care provider for wounds bilateral lower extremities that have been breaking down showing signs of improvement than breaking down again currently the right leg is worse than the left patient states this has been going on for about 30 days.  Patient experiences left leg pain he states the leg is very hard and causes him discomfort he has had lung cancer previously.  Patient relates having fallen when he stood up to get out of bed rolling his right foot and ankle area.  Patient states it is so painful he can barely stand to put any pressure on it.  Patient states he also stop taking the gabapentin he states it made him see spots.    Past Medical History:   Diagnosis Date    Tremor      Past Surgical History:   Procedure Laterality Date    ABDOMINAL SURGERY       Family History   Family history unknown: Yes     Social History     Socioeconomic History    Marital status:      Spouse name: Not on file    Number of children: Not on file    Years of education: Not on file    Highest education level: Not on file   Occupational History    Not on file   Social Needs    Financial resource strain: Not on file    Food insecurity     Worry: Not on file     Inability: Not on file    Transportation needs     Medical: Not on file     Non-medical: Not on file   Tobacco Use    Smoking status: Current Every Day Smoker     Years: 50.00     Types: Cigarettes    Smokeless tobacco: Never Used   Substance and Sexual Activity    Alcohol use: Yes     Frequency: 4 or more times a week     Drinks per session: 10 or more     Binge frequency: Daily or almost daily    Drug use: Not Currently    Sexual activity: Not Currently   Lifestyle    Physical activity     Days per week: Not on file     Minutes per  "session: Not on file    Stress: Not on file   Relationships    Social connections     Talks on phone: Not on file     Gets together: Not on file     Attends Yazidi service: Not on file     Active member of club or organization: Not on file     Attends meetings of clubs or organizations: Not on file     Relationship status: Not on file   Other Topics Concern    Not on file   Social History Narrative    Not on file       Current Outpatient Medications   Medication Sig Dispense Refill    albuterol (PROAIR HFA) 90 mcg/actuation inhaler Inhale 2 puffs into the lungs every 6 (six) hours as needed for Wheezing or Shortness of Breath. 18 g 6    furosemide (LASIX) 40 MG tablet Take 1 tablet (40 mg total) by mouth once daily. for 7 days 7 tablet 0    mupirocin calcium 2% (BACTROBAN) 2 % cream Apply topically 2 (two) times daily. 15 g 0    primidone (MYSOLINE) 250 MG Tab Take 500 mg by mouth.      silver sulfADIAZINE 1% (SILVADENE) 1 % cream Apply topically once daily. 50 g 2    gabapentin (NEURONTIN) 600 MG tablet Take 1 tablet (600 mg total) by mouth nightly. (Patient not taking: Reported on 8/3/2020) 30 tablet 1     No current facility-administered medications for this visit.      Review of patient's allergies indicates:   Allergen Reactions    Gabapentin Other (See Comments)     Seeing spots and dizziness     Naproxen     Nsaids (non-steroidal anti-inflammatory drug)        Review of Systems   Musculoskeletal: Positive for arthralgias, gait problem and joint swelling.   All other systems reviewed and are negative.      Objective:      Vitals:    08/03/20 1443   BP: 106/67   Pulse: 77   Temp: 98.1 °F (36.7 °C)   Weight: 65.8 kg (145 lb)   Height: 6' 1" (1.854 m)     Physical Exam  Vitals signs and nursing note reviewed.   Constitutional:       Appearance: He is ill-appearing.   Cardiovascular:      Pulses:           Dorsalis pedis pulses are 1+ on the right side and 1+ on the left side.        Posterior " tibial pulses are 0 on the right side and 0 on the left side.   Pulmonary:      Effort: Respiratory distress present.   Musculoskeletal:      Right lower leg: Edema present.      Left lower leg: Edema present.        Legs:       Right foot: Decreased range of motion.      Left foot: Decreased range of motion.   Feet:      Right foot:      Protective Sensation: 2 sites tested. 2 sites sensed.      Skin integrity: Ulcer, skin breakdown and erythema present.      Left foot:      Protective Sensation: 2 sites tested. 2 sites sensed.      Skin integrity: Ulcer present.   Skin:     Capillary Refill: Capillary refill takes more than 3 seconds.      Findings: Erythema present.   Neurological:      General: No focal deficit present.      Mental Status: He is alert.   Psychiatric:         Mood and Affect: Mood normal.         Behavior: Behavior normal.         Thought Content: Thought content normal.                                                                              Assessment:       1. Wound of right lower extremity, subsequent encounter    2. Fall, initial encounter    3. Injury of right ankle, initial encounter        Plan:         patient presents for follow-up of areas of breakdown on the right lower extremity he also states that he fell out of bed he went to stand up and is low right leg and ankle just gave out on him he states it is extremely painful he did not sleep at all last night he has subsequently discontinued taking the gabapentin because it makes him see spots.  At this time the patient currently does not have insurance so ordering home health is not an option for this patient I have advised his family member that is present today they are going to need to continue to change the dressings on the right lower extremity.  Patient states he thinks his leg is doing a lot better the wounds are finally starting to close the Xeroform has worked very well was simply cleaning the area with some wound cleanser  did not Dakin solution applying Xeroform and a well-padded thick dressing this is to be changed every 48 hr.  Patient did roll his ankle he has an apparent sprain of the lateral compartment however we did take x-rays of the area to rule out the possibility of a fracture of the foot or the ankle no fractures no signs of dislocation noted.  Patient did have notable bone demineralization noted of the both the foot and the ankle right.  Patient was dispensed supplies again he does not currently have insurance so home health is not an option it is also difficult for him to afford the supplies so we tried to help him out as much as we possibly can.  I am going to recommended 2 week follow-up for the patient since he is doing so much better obviously any changes questions concerns he is to contact us immediately.  Total face-to-face time including discussion evaluation treatment wound care today equaled 25 min.This note was created using [employer] voice recognition software that occasionally misinterpreted phrases or words.

## 2020-08-11 NOTE — PROGRESS NOTES
"[facility] - Clinic Note    Subjective      Mr. Mcintosh is a 64 y.o. male who presents to clinic with complaints of leg swelling and pain.     Patient is new to me. Accompanied by his daughter.   Has been having swelling of his legs for the past couple of months.   Has gotten worse over the past month bilaterally. But states that right is worse than the left.   States that over the past week, there has been drainage from the right lower leg.   Admits to pain with walking.   Has a history of lung cancer. Followed by [facility].      PMH David has a past medical history of Tremor.   PSXH David has a past surgical history that includes Abdominal surgery.    David's Family history is unknown by patient.   SH David reports that he has been smoking cigarettes. He has smoked for the past 50.00 years. He has never used smokeless tobacco. He reports current alcohol use. He reports previous drug use.   ED Hernandez is allergic to gabapentin; naproxen; and nsaids (non-steroidal anti-inflammatory drug).   PRIYA Hernandez has a current medication list which includes the following prescription(s): primidone, albuterol, furosemide, gabapentin, mupirocin calcium 2%, and silver sulfadiazine 1%.     Review of Systems   Constitutional: Negative for activity change, appetite change, chills, fatigue and fever.   Eyes: Negative for visual disturbance.   Respiratory: Negative for cough and shortness of breath.    Cardiovascular: Positive for leg swelling. Negative for chest pain and palpitations.   Gastrointestinal: Negative for abdominal pain, nausea and vomiting.   Musculoskeletal: Positive for gait problem.   Skin: Positive for wound.   Neurological: Negative for dizziness, syncope and headaches.   Psychiatric/Behavioral: Negative for confusion.     Objective     /68   Pulse 100   Temp 97 °F (36.1 °C) (Oral)   Ht 6' 1" (1.854 m)   Wt 64 kg (141 lb)   SpO2 99%   BMI 18.60 kg/m²     Physical Exam   Constitutional:  Non-toxic " appearance. He does not appear ill. No distress.   Thin pleasant WM   HENT:   Head: Normocephalic and atraumatic.   Eyes: Right eye exhibits no discharge. Left eye exhibits no discharge.   Cardiovascular: Normal rate, regular rhythm, normal heart sounds and normal pulses. Exam reveals no gallop and no friction rub.   No murmur heard.  Decrease pedal pulses   Pulmonary/Chest: Effort normal and breath sounds normal. No respiratory distress. He has no wheezes. He has no rhonchi. He has no rales.   Lymphadenopathy:     He has no cervical adenopathy.   Neurological: He is alert.   Skin: Skin is warm and dry. Capillary refill takes less than 2 seconds. He is not diaphoretic.   Chronic skin changes noted in the LE bilaterally. Right leg with drainage on the lateral aspect. Picture see below.   Psychiatric: His behavior is normal. Mood, judgment and thought content normal.   Vitals reviewed.             Assessment/Plan     David was seen today for leg swelling.    Diagnoses and all orders for this visit:  -New patient and new problem to me    Wound of right lower extremity, initial encounter  -     mupirocin calcium 2% (BACTROBAN) 2 % cream; Apply topically 2 (two) times daily.  -     Ambulatory referral/consult to Podiatry; Future  -     CULTURE, AEROBIC  (SPECIFY SOURCE)  -     doxycycline (VIBRA-TABS) 100 MG tablet; Take 1 tablet (100 mg total) by mouth every 12 (twelve) hours. for 10 days     Venous stasis dermatitis of both lower extremities  -     Ambulatory referral/consult to Podiatry; Future    Leg swelling  -     furosemide (LASIX) 40 MG tablet; Take 1 tablet (40 mg total) by mouth once daily. for 7 days    Follow up in about 1 week (around 6/24/2020).    Future Appointments   Date Time Provider Department Center   9/9/2020  2:30 PM Jason Maya DPM [facility]       Janey Garcia MD  Family Medicine  [facility]

## 2020-08-16 NOTE — PROGRESS NOTES
Health Maintenance Due   Topic Date Due    TETANUS VACCINE  09/16/1973    Pneumococcal Vaccine (Medium Risk) (1 of 1 - PPSV23) 09/16/1974    Shingles Vaccine (1 of 2) 09/16/2005     Updates were requested from care everywhere.  Chart was reviewed for overdue [facility]active [facility] ([facility]) topics (CRS, Breast Cancer Screening, Eye exam)  Health Maintenance has been updated.  LINKS immunization registry triggered.  Immunizations were reconciled.

## 2020-08-20 NOTE — PROGRESS NOTES
Subjective:       Patient ID: David Mcintosh is a 64 y.o. male.    Chief Complaint: Follow-up, Foot Problem, and Foot Ulcer   Patient presents as a new patient evaluation as referred by his primary care provider for wounds bilateral lower extremities that have been breaking down showing signs of improvement than breaking down again currently the right leg is worse than the left patient states this has been going on for about 60 days.  Patient experiences left leg pain he states the leg is very hard and causes him discomfort he has had lung cancer previously.  Patient relates having fallen when he stood up to get out of bed rolling his right foot and ankle area.  Patient states it is so painful he can barely stand to put any pressure on it.      Past Medical History:   Diagnosis Date    Tremor      Past Surgical History:   Procedure Laterality Date    ABDOMINAL SURGERY       Family History   Family history unknown: Yes     Social History     Socioeconomic History    Marital status:      Spouse name: Not on file    Number of children: Not on file    Years of education: Not on file    Highest education level: Not on file   Occupational History    Not on file   Social Needs    Financial resource strain: Not on file    Food insecurity     Worry: Not on file     Inability: Not on file    Transportation needs     Medical: Not on file     Non-medical: Not on file   Tobacco Use    Smoking status: Current Every Day Smoker     Years: 50.00     Types: Cigarettes    Smokeless tobacco: Never Used   Substance and Sexual Activity    Alcohol use: Yes     Frequency: 4 or more times a week     Drinks per session: 10 or more     Binge frequency: Daily or almost daily    Drug use: Not Currently    Sexual activity: Not Currently   Lifestyle    Physical activity     Days per week: Not on file     Minutes per session: Not on file    Stress: Not on file   Relationships    Social connections     Talks on phone: Not  "on file     Gets together: Not on file     Attends [employer] service: Not on file     Active member of club or organization: Not on file     Attends meetings of clubs or organizations: Not on file     Relationship status: Not on file   Other Topics Concern    Not on file   Social History Narrative    Not on file       Current Outpatient Medications   Medication Sig Dispense Refill    albuterol (PROAIR HFA) 90 mcg/actuation inhaler Inhale 2 puffs into the lungs every 6 (six) hours as needed for Wheezing or Shortness of Breath. 18 g 6    furosemide (LASIX) 40 MG tablet Take 1 tablet (40 mg total) by mouth once daily. for 7 days 7 tablet 0    gabapentin (NEURONTIN) 600 MG tablet Take 1 tablet (600 mg total) by mouth nightly. (Patient not taking: Reported on 8/3/2020) 30 tablet 1    mupirocin calcium 2% (BACTROBAN) 2 % cream Apply topically 2 (two) times daily. 15 g 0    primidone (MYSOLINE) 250 MG Tab Take 500 mg by mouth.      silver sulfADIAZINE 1% (SILVADENE) 1 % cream Apply topically once daily. 50 g 2     No current facility-administered medications for this visit.      Review of patient's allergies indicates:   Allergen Reactions    Gabapentin Other (See Comments)     Seeing spots and dizziness     Naproxen     Nsaids (non-steroidal anti-inflammatory drug)        Review of Systems   Musculoskeletal: Positive for arthralgias, gait problem and joint swelling.   All other systems reviewed and are negative.      Objective:      Vitals:    08/17/20 1515   BP: 123/74   Pulse: 85   Temp: 98.1 °F (36.7 °C)   Weight: 65.8 kg (145 lb)   Height: 6' 1" (1.854 m)     Physical Exam  Vitals signs and nursing note reviewed.   Constitutional:       Appearance: He is ill-appearing.   Cardiovascular:      Pulses:           Dorsalis pedis pulses are 1+ on the right side and 1+ on the left side.        Posterior tibial pulses are 0 on the right side and 0 on the left side.   Pulmonary:      Effort: Respiratory distress " present.   Musculoskeletal:      Right lower leg: Edema present.      Left lower leg: Edema present.        Legs:       Right foot: Decreased range of motion.      Left foot: Decreased range of motion.   Feet:      Right foot:      Protective Sensation: 2 sites tested. 2 sites sensed.      Skin integrity: Ulcer, skin breakdown and erythema present.      Left foot:      Protective Sensation: 2 sites tested. 2 sites sensed.      Skin integrity: Ulcer present.   Skin:     Capillary Refill: Capillary refill takes more than 3 seconds.      Findings: Erythema present.   Neurological:      General: No focal deficit present.      Mental Status: He is alert.   Psychiatric:         Mood and Affect: Mood normal.         Behavior: Behavior normal.         Thought Content: Thought content normal.                                                                                          Assessment:       1. Injury of right ankle, subsequent encounter    2. Wound of right lower extremity, subsequent encounter    3. Peripheral vascular disease    4. Venous stasis dermatitis of both lower extremities    5. Fall, subsequent encounter        Plan:         Patient presents for follow-up of areas of breakdown on the right lower extremity he also states that he fell out of bed he went to stand up and is low right leg and ankle just gave out on him he states it is extremely painful he did not sleep at all last night he has subsequently discontinued taking the gabapentin because it makes him see spots.  At this time the patient currently does not have insurance so ordering home health is not an option for this patient I have advised his family member that is present today they are going to need to continue to change the dressings on the right lower extremity.  Patient states he thinks his leg is doing a lot better the wounds are finally starting to close the Xeroform has worked very well was simply cleaning the area with some wound cleanser  did not Dakin solution applying Xeroform and a well-padded thick dressing this is to be changed every 48 hr.    Patient was dispensed supplies again he does not currently have insurance so home health is not an option it is also difficult for him to afford the supplies so we tried to help him out as much as we possibly can.  I am going to recommended 3 week follow-up for the patient since he is doing so much better obviously any changes questions concerns he is to contact us immediately.  Patient's right lower extremity is definitely improving he has been applying the Dakin solution even though this was discontinued these beats this is brought back and irritating his skin he was told quite some time ago to stop using this his granddaughter was present today she indicated she knows he was told to stop using this also he has only supposed to be using wound cleanser on the area which she was dispensed today.  Patient was dispensed necessary supplies to change the Unna boot to the right lower extremity every 3-5 days.  No active signs of infection currently noted.  Unna boot applied to the right lower extremity this has been working very well to help improve the patient's right leg.  Total face-to-face time including discussion evaluation treatment wound care today equaled 25 min.This note was created using [employer] voice recognition software that occasionally misinterpreted phrases or words.

## 2020-09-03 NOTE — PROGRESS NOTES
"[facility] - Clinic Note    Subjective      Mr. Mcintosh is a 64 y.o. male who presents to clinic for a follow up of leg swelling.     Patient was seen 1 week ago with complaints of leg swelling and drainage from the legs.   Has venous stasis.   He was referred to podiatry and given doxycycline for the wound on the right LE.   He was seen by podiatry yesterday. Legs were cleaned in office and wrapped with ace bandage with compression. Given silvidine to use on the legs.   He was also given a course of cipro.   And is to follow up in 2 weeks with podiatry.    PMH David has a past medical history of Tremor.   PSXH David has a past surgical history that includes Abdominal surgery.    David's Family history is unknown by patient.   ALEXANDRA Hernandez reports that he has been smoking cigarettes. He has smoked for the past 50.00 years. He has never used smokeless tobacco. He reports current alcohol use. He reports previous drug use.   ED Hernandez is allergic to gabapentin; naproxen; and nsaids (non-steroidal anti-inflammatory drug).   PRIYA Hernandez has a current medication list which includes the following prescription(s): albuterol, mupirocin calcium 2%, primidone, silver sulfadiazine 1%, furosemide, and gabapentin.     Review of Systems   Constitutional: Negative for activity change, appetite change, chills, fatigue and fever.   Eyes: Negative for visual disturbance.   Respiratory: Negative for cough and shortness of breath.    Cardiovascular: Positive for leg swelling. Negative for chest pain and palpitations.   Gastrointestinal: Negative for abdominal pain, nausea and vomiting.   Skin: Positive for wound.   Neurological: Negative for dizziness, syncope and headaches.   Psychiatric/Behavioral: Negative for confusion.     Objective     BP 94/62   Pulse 84   Temp 97.3 °F (36.3 °C) (Temporal)   Resp 18   Ht 6' 1" (1.854 m)   Wt 65.8 kg (145 lb)   BMI 19.13 kg/m²     Physical Exam   Constitutional:  Non-toxic appearance. He " does not appear ill. No distress.   HENT:   Head: Normocephalic and atraumatic.   Eyes: Right eye exhibits no discharge. Left eye exhibits no discharge.   Cardiovascular: Normal rate, regular rhythm, normal heart sounds and normal pulses. Exam reveals no gallop and no friction rub.   No murmur heard.  Pulmonary/Chest: Effort normal and breath sounds normal. No respiratory distress. He has no wheezes. He has no rhonchi. He has no rales.   Abdominal: Normal appearance.   Musculoskeletal:      Comments: Legs wrapped   Lymphadenopathy:     He has no cervical adenopathy.   Neurological: He is alert.   Skin: Skin is warm and dry. Capillary refill takes less than 2 seconds. He is not diaphoretic.   Psychiatric: His behavior is normal. Mood, judgment and thought content normal.   Vitals reviewed.     Assessment/Plan     David was seen today for follow-up.    Diagnoses and all orders for this visit:    Wound of right lower extremity, subsequent encounter    Venous stasis dermatitis of both lower extremities    Peripheral vascular disease    -keep follow up with podiatry. Finish ABx course    Janey Garcia MD  Family Medicine  [facility]

## 2020-09-16 PROBLEM — I83.019 VENOUS ULCER OF RIGHT LEG: Status: ACTIVE | Noted: 2020-01-01

## 2020-09-16 PROBLEM — L97.919 VENOUS ULCER OF RIGHT LEG: Status: ACTIVE | Noted: 2020-01-01

## 2020-09-16 PROBLEM — R60.0 EDEMA OF LEG: Status: ACTIVE | Noted: 2020-01-01

## 2020-09-19 NOTE — PROGRESS NOTES
Subjective:       Patient ID: David Mcintosh is a 65 y.o. male.    Chief Complaint: Follow-up, Foot Problem, and Foot Ulcer   Patient presents as a new patient evaluation as referred by his primary care provider for wounds bilateral lower extremities that have been breaking down showing signs of improvement than breaking down again currently the right leg is worse than the left.       Past Medical History:   Diagnosis Date    Tremor      Past Surgical History:   Procedure Laterality Date    ABDOMINAL SURGERY       Family History   Family history unknown: Yes     Social History     Socioeconomic History    Marital status:      Spouse name: Not on file    Number of children: Not on file    Years of education: Not on file    Highest education level: Not on file   Occupational History    Not on file   Social Needs    Financial resource strain: Not on file    Food insecurity     Worry: Not on file     Inability: Not on file    Transportation needs     Medical: Not on file     Non-medical: Not on file   Tobacco Use    Smoking status: Current Every Day Smoker     Years: 50.00     Types: Cigarettes    Smokeless tobacco: Never Used   Substance and Sexual Activity    Alcohol use: Yes     Frequency: 4 or more times a week     Drinks per session: 10 or more     Binge frequency: Daily or almost daily    Drug use: Not Currently    Sexual activity: Not Currently   Lifestyle    Physical activity     Days per week: Not on file     Minutes per session: Not on file    Stress: Not on file   Relationships    Social connections     Talks on phone: Not on file     Gets together: Not on file     Attends Judaism service: Not on file     Active member of club or organization: Not on file     Attends meetings of clubs or organizations: Not on file     Relationship status: Not on file   Other Topics Concern    Not on file   Social History Narrative    Not on file       Current Outpatient Medications   Medication  "Sig Dispense Refill    albuterol (PROAIR HFA) 90 mcg/actuation inhaler Inhale 2 puffs into the lungs every 6 (six) hours as needed for Wheezing or Shortness of Breath. 18 g 6    mupirocin calcium 2% (BACTROBAN) 2 % cream Apply topically 2 (two) times daily. 15 g 0    primidone (MYSOLINE) 250 MG Tab Take 500 mg by mouth.      silver sulfADIAZINE 1% (SILVADENE) 1 % cream Apply topically once daily. 50 g 2    furosemide (LASIX) 40 MG tablet Take 1 tablet (40 mg total) by mouth once daily. for 7 days 7 tablet 0    gabapentin (NEURONTIN) 600 MG tablet Take 1 tablet (600 mg total) by mouth nightly. (Patient not taking: Reported on 8/3/2020) 30 tablet 1     No current facility-administered medications for this visit.      Review of patient's allergies indicates:   Allergen Reactions    Gabapentin Other (See Comments)     Seeing spots and dizziness     Naproxen     Nsaids (non-steroidal anti-inflammatory drug)        Review of Systems   Musculoskeletal: Positive for arthralgias, gait problem and joint swelling.   All other systems reviewed and are negative.      Objective:      Vitals:    09/16/20 0915   BP: 106/77   Pulse: 63   Temp: 97.8 °F (36.6 °C)   Weight: 65.8 kg (145 lb)   Height: 6' 1" (1.854 m)     Physical Exam  Vitals signs and nursing note reviewed.   Constitutional:       Appearance: He is ill-appearing.   Cardiovascular:      Pulses:           Dorsalis pedis pulses are 1+ on the right side and 1+ on the left side.        Posterior tibial pulses are 0 on the right side and 0 on the left side.   Pulmonary:      Effort: Respiratory distress present.   Musculoskeletal:      Right lower leg: Edema present.      Left lower leg: Edema present.        Legs:       Right foot: Decreased range of motion.      Left foot: Decreased range of motion.   Feet:      Right foot:      Protective Sensation: 2 sites tested. 2 sites sensed.      Skin integrity: Ulcer, skin breakdown and erythema present.      Left foot:    "   Protective Sensation: 2 sites tested. 2 sites sensed.      Skin integrity: Ulcer present.   Skin:     Capillary Refill: Capillary refill takes more than 3 seconds.      Findings: Erythema present.   Neurological:      General: No focal deficit present.      Mental Status: He is alert.   Psychiatric:         Mood and Affect: Mood normal.         Behavior: Behavior normal.         Thought Content: Thought content normal.                                                              Assessment:       1. Wound of right lower extremity, subsequent encounter    2. Venous stasis dermatitis of both lower extremities    3. Injury of right ankle, subsequent encounter    4. Venous ulcer of right leg    5. Edema of leg        Plan:         Patient presents for follow-up of areas of breakdown on the right lower extremity.  Patient indicates his right lower extremity is feeling much better and it is looking much better I am going to allow the patient to get the area wet all of the areas of previous breakdown and ulceration are healed in closed there is no active signs of infection I did advised the patient he is going to need to continue application of the Unna boots on the right lower extremity I have ordered medical supplies through [facility] for the patient advising the patient this is very important that we control the swelling inflammation and skin breakdown especially because he has done so much better.  Patient is in agreement with this even though it is an inconvenience having to leave the Unna boot on for 4-5 days he states it is worth it not having the pain that he was having and the areas of skin breakdown which are certainly at risk for infection.  I plan to follow up with the patient in 3 weeks the patient was not able to tolerate the gabapentin very well and has discontinue the gabapentin.  New Unna boot was applied to the right lower extremity today I have advised the patient he should not be using the Dakin  solution but just a wound cleanser the Dakin solution appear to be a little bit too strong for the patient's skin.  Wound care supplies have been ordered through [facility] for the patient.  Patient's right lower extremity is stable.  Patient's left lower extremity is also stable with no open wounds or ulcerations.  Patient states that he has insurance coverage as of today which will make treating him a lot easier there were supplies and services that we tried to get for the patient that we could not because the patient could not afford these without insurance.  Total face-to-face time including discussion evaluation treatment wound care today equaled 25 min.This note was created using [id number] voice recognition software that occasionally misinterpreted phrases or words.

## 2020-09-30 NOTE — PROGRESS NOTES
Chart was reviewed for overdue [facility]active [facility]counters ([facility])  topics  Updates were requested from care everywhere  Health Maintenance has been updated  LINKS immunization registry triggered

## 2020-10-06 NOTE — PROGRESS NOTES
Subjective:       Patient ID: David Mcintosh is a 65 y.o. male.    Chief Complaint: Follow-up   Patient presents as a new patient evaluation as referred by his primary care provider for wounds bilateral lower extremities that have been breaking down showing signs of improvement than breaking down again currently the right leg is worse than the left.       Past Medical History:   Diagnosis Date    Tremor      Past Surgical History:   Procedure Laterality Date    ABDOMINAL SURGERY       Family History   Family history unknown: Yes     Social History     Socioeconomic History    Marital status:      Spouse name: Not on file    Number of children: Not on file    Years of education: Not on file    Highest education level: Not on file   Occupational History    Not on file   Social Needs    Financial resource strain: Not on file    Food insecurity     Worry: Not on file     Inability: Not on file    Transportation needs     Medical: Not on file     Non-medical: Not on file   Tobacco Use    Smoking status: Current Every Day Smoker     Years: 50.00     Types: Cigarettes    Smokeless tobacco: Never Used   Substance and Sexual Activity    Alcohol use: Yes     Frequency: 4 or more times a week     Drinks per session: 10 or more     Binge frequency: Daily or almost daily    Drug use: Not Currently    Sexual activity: Not Currently   Lifestyle    Physical activity     Days per week: Not on file     Minutes per session: Not on file    Stress: Not on file   Relationships    Social connections     Talks on phone: Not on file     Gets together: Not on file     Attends Confucianism service: Not on file     Active member of club or organization: Not on file     Attends meetings of clubs or organizations: Not on file     Relationship status: Not on file   Other Topics Concern    Not on file   Social History Narrative    Not on file       Current Outpatient Medications   Medication Sig Dispense Refill     "albuterol (PROAIR HFA) 90 mcg/actuation inhaler Inhale 2 puffs into the lungs every 6 (six) hours as needed for Wheezing or Shortness of Breath. 18 g 6    mupirocin calcium 2% (BACTROBAN) 2 % cream Apply topically 2 (two) times daily. 15 g 0    primidone (MYSOLINE) 250 MG Tab Take 500 mg by mouth.      silver sulfADIAZINE 1% (SILVADENE) 1 % cream Apply topically once daily. 50 g 2    furosemide (LASIX) 40 MG tablet Take 1 tablet (40 mg total) by mouth once daily. for 7 days 7 tablet 0    gabapentin (NEURONTIN) 600 MG tablet Take 1 tablet (600 mg total) by mouth nightly. (Patient not taking: Reported on 8/3/2020) 30 tablet 1     No current facility-administered medications for this visit.      Review of patient's allergies indicates:   Allergen Reactions    Gabapentin Other (See Comments)     Seeing spots and dizziness     Naproxen     Nsaids (non-steroidal anti-inflammatory drug)        Review of Systems   Musculoskeletal: Positive for arthralgias, gait problem and joint swelling.   All other systems reviewed and are negative.      Objective:      Vitals:    10/05/20 0836   BP: 125/80   Pulse: 85   Resp: 19   Temp: 97.3 °F (36.3 °C)   TempSrc: Temporal   SpO2: 98%   Weight: 65.8 kg (145 lb)   Height: 6' 1" (1.854 m)     Physical Exam  Vitals signs and nursing note reviewed.   Constitutional:       Appearance: He is ill-appearing.   Cardiovascular:      Pulses:           Dorsalis pedis pulses are 1+ on the right side and 1+ on the left side.        Posterior tibial pulses are 0 on the right side and 0 on the left side.   Pulmonary:      Effort: Respiratory distress present.   Musculoskeletal:      Right lower leg: Edema present.      Left lower leg: Edema present.        Legs:       Right foot: Decreased range of motion.      Left foot: Decreased range of motion.   Feet:      Right foot:      Protective Sensation: 2 sites tested. 2 sites sensed.      Skin integrity: Ulcer, skin breakdown and erythema present. "      Left foot:      Protective Sensation: 2 sites tested. 2 sites sensed.      Skin integrity: Ulcer present.   Skin:     Capillary Refill: Capillary refill takes more than 3 seconds.      Findings: Erythema present.   Neurological:      General: No focal deficit present.      Mental Status: He is alert.   Psychiatric:         Mood and Affect: Mood normal.         Behavior: Behavior normal.         Thought Content: Thought content normal.                                 Assessment:       1. Venous stasis dermatitis of both lower extremities    2. Lump in the groin    3. Peripheral vascular disease    4. Wound of right lower extremity, subsequent encounter    5. Edema of leg        Plan:         Patient presents for follow-up of areas of breakdown on the right lower extremity.  Patient indicates his right lower extremity is feeling much better and it is looking much better.  All of the areas of previous breakdown and ulceration are healed in closed there is no active signs of infection I did advised the patient he is going to need to continue application of the compression dressing on the right lower extremity.  I have advised the patient we can discontinue the Unna boots now that we have the swelling under control in the right lower extremity however the compression dressing needs to be applied and changed every 1-3 days as the patient tolerates a Boyer type compression dressing was applied with cast padding applied from just below the knee to the base of the digits and moderate compression Ace bandage was placed overlying this area as tolerated patient advised he can continue to get his leg wet bathing but really needs to keep this compression dressing on the area as directed.  Patient was dispensed supplies to maintain this dressing as directed he indicated today about 2 weeks ago he noticed a lump in the right side of his groin area he was very confused indicating that he needed a new primary care provider  however according to the epic system the patient has primary care but he states he did not have a primary care provider so I am sending in a new referral to Family Medicine anyway to evaluate the patient for the lump on the right side of his groin.  I plan to follow up with the patient in 4 weeks he has been advised to contact us with any problems questions or concerns any areas of breakdown that occur he should contact us immediately.  Total face-to-face time including discussion evaluation and treatment equaled 25 min this includes application of Boyer compression dressing right side.  Total face-to-face time including discussion evaluation treatment wound care today equaled 25 min.This note was created using [employer] voice recognition software that occasionally misinterpreted phrases or words.

## 2020-10-26 PROBLEM — J44.9 CHRONIC OBSTRUCTIVE PULMONARY DISEASE: Status: ACTIVE | Noted: 2020-01-01

## 2020-10-28 NOTE — PROGRESS NOTES
Subjective:       Patient ID: David Mcintosh is a 65 y.o. male.    Chief Complaint: Follow-up   Patient presents as a follow-up patient evaluation as referred by his primary care provider for wounds bilateral lower extremities that have been breaking down showing signs of improvement than breaking down again currently the right leg is worse than the left.       Past Medical History:   Diagnosis Date    Tremor      Past Surgical History:   Procedure Laterality Date    ABDOMINAL SURGERY       Family History   Family history unknown: Yes     Social History     Socioeconomic History    Marital status:      Spouse name: Not on file    Number of children: Not on file    Years of education: Not on file    Highest education level: Not on file   Occupational History    Not on file   Social Needs    Financial resource strain: Not on file    Food insecurity     Worry: Not on file     Inability: Not on file    Transportation needs     Medical: Not on file     Non-medical: Not on file   Tobacco Use    Smoking status: Current Every Day Smoker     Years: 50.00     Types: Cigarettes    Smokeless tobacco: Never Used   Substance and Sexual Activity    Alcohol use: Yes     Frequency: 4 or more times a week     Drinks per session: 10 or more     Binge frequency: Daily or almost daily    Drug use: Not Currently    Sexual activity: Not Currently   Lifestyle    Physical activity     Days per week: Not on file     Minutes per session: Not on file    Stress: Not on file   Relationships    Social connections     Talks on phone: Not on file     Gets together: Not on file     Attends Jainism service: Not on file     Active member of club or organization: Not on file     Attends meetings of clubs or organizations: Not on file     Relationship status: Not on file   Other Topics Concern    Not on file   Social History Narrative    Not on file       Current Outpatient Medications   Medication Sig Dispense Refill     "albuterol (PROAIR HFA) 90 mcg/actuation inhaler Inhale 2 puffs into the lungs every 6 (six) hours as needed for Wheezing or Shortness of Breath. 18 g 6    primidone (MYSOLINE) 250 MG Tab Take 500 mg by mouth.       No current facility-administered medications for this visit.      Review of patient's allergies indicates:   Allergen Reactions    Gabapentin Other (See Comments)     Seeing spots and dizziness     Naproxen     Nsaids (non-steroidal anti-inflammatory drug)        Review of Systems   Musculoskeletal: Positive for arthralgias, gait problem and joint swelling.   All other systems reviewed and are negative.      Objective:      Vitals:    10/26/20 1345   BP: 125/82   Pulse: 82   Resp: 18   Temp: 98.2 °F (36.8 °C)   TempSrc: Temporal   SpO2: 99%   Weight: 65.8 kg (145 lb)   Height: 6' 1" (1.854 m)     Physical Exam  Vitals signs and nursing note reviewed.   Constitutional:       Appearance: He is ill-appearing.   Cardiovascular:      Pulses:           Dorsalis pedis pulses are 1+ on the right side and 1+ on the left side.        Posterior tibial pulses are 0 on the right side and 0 on the left side.   Pulmonary:      Effort: Respiratory distress present.   Musculoskeletal:      Right lower leg: Edema present.      Left lower leg: Edema present.        Legs:       Right foot: Decreased range of motion.      Left foot: Decreased range of motion.   Feet:      Right foot:      Protective Sensation: 2 sites tested. 2 sites sensed.      Skin integrity: Ulcer, skin breakdown and erythema present.      Left foot:      Protective Sensation: 2 sites tested. 2 sites sensed.      Skin integrity: Ulcer present.   Skin:     Capillary Refill: Capillary refill takes more than 3 seconds.      Findings: Erythema present.   Neurological:      General: No focal deficit present.      Mental Status: He is alert.   Psychiatric:         Mood and Affect: Mood normal.         Behavior: Behavior normal.         Thought Content: " Thought content normal.                                                                          Assessment:       1. Wound of right lower extremity, subsequent encounter    2. Venous stasis dermatitis of both lower extremities    3. Edema of leg    4. Chronic obstructive pulmonary disease, unspecified COPD type        Plan:         Patient presents for follow-up of areas of breakdown on the right lower extremity.  Patient states he was doing very well and his right lower extremity swelling discomfort and skin breakdown was doing really well while we were doing the Unna boots changing them every 3-5 days however on his last visit I started to use a Boyer compression since dressing to the right lower extremity instead of the Unna boot I thought this would be easier for the patient to removed bathes and then reapply it with the help of a family member however it appears that variations in the amount of compression used when applying the Boyer compression dressing may have allowed for the swelling the vasculitis the cellulitis and breakdown of the skin on the right leg I have advised the patient were going to have to go back to the Unna boot application every 3-5 days he can bathe in between at the time of dressing change.  Patient's daughter was present with him today she states that she understands and will help the patient apply the Unna boot as necessary to the right lower extremity patient has applied this on his own and it has worked well again we tried to get away from doing the Unna boot but at this time were going to have to stick with this this was controlling the breakdown the swelling the cellulitis of the right lower extremity very well.  I am also referring the patient to Cardiology for for a cardiac workup patient does have a history of COPD I have referred the patient to Dr. Garcia.  I am going to plan to follow up with the patient 2 weeks both the patient and the patient's daughter understand how to  change the Unna boot dressing on the right lower extremity and they know that he had to change it every 3-5 days with moderate compression we did supply them with the supplies necessary to change the Unna boot as directed Unna boot was applied today right lower extremity.  Prior to application wound cleanser was used to clean the entire right leg this area was thoroughly dried.  This note was created using [employer] voice recognition software that occasionally misinterpreted phrases or words.

## 2020-11-03 NOTE — TELEPHONE ENCOUNTER
Spoke to patient's guardian she states the left leg is now swollen and very hard. She has patient elevating his leg, started elevation this afternoon. Patient has f/u appt on 11/9/2020. Please advise any other tx for patient until appt, apply ace wrap with slight compression?

## 2020-11-03 NOTE — TELEPHONE ENCOUNTER
----- Message from Bart Pierson sent at 11/3/2020  1:57 PM CST -----  Contact: dtr/Chele Elaine called in and stated she wanted to let Dr. Maya know that patients left leg is now swollen.  Chele stated Dr. Maya has been treating patients right leg.  Patient does have appointment on 11/9/20.  Chele's call back number is [phone]

## 2020-11-03 NOTE — TELEPHONE ENCOUNTER
Advised patient's daughter to wrap ace wrap with a little compression and have him keep elevated. If leg becomes red, warm, or pain increases call the office. She verbalized understanding

## 2020-11-08 NOTE — PROGRESS NOTES
Chart was reviewed for overdue Proactive [facility] Encounters ([facility])  topics  Updates were requested from care everywhere  Health Maintenance was unable to be updated  [place] immunization registry triggered

## 2020-11-09 PROBLEM — D69.2 PURPURA: Status: ACTIVE | Noted: 2020-01-01

## 2020-11-09 PROBLEM — D69.6 THROMBOCYTOPENIA: Status: ACTIVE | Noted: 2020-01-01

## 2020-11-09 PROBLEM — T79.6XXA TRAUMATIC RHABDOMYOLYSIS: Status: ACTIVE | Noted: 2020-01-01

## 2020-11-09 PROBLEM — E87.1 HYPONATREMIA: Status: ACTIVE | Noted: 2020-01-01

## 2020-11-09 PROBLEM — E87.6 HYPOKALEMIA: Status: ACTIVE | Noted: 2020-01-01

## 2020-11-09 PROBLEM — N30.00 ACUTE CYSTITIS WITHOUT HEMATURIA: Status: ACTIVE | Noted: 2020-01-01

## 2020-11-09 PROBLEM — I95.9 HYPOTENSION: Status: ACTIVE | Noted: 2020-01-01

## 2020-11-09 PROBLEM — R55 SYNCOPE AND COLLAPSE: Status: ACTIVE | Noted: 2020-01-01

## 2020-11-09 PROBLEM — K70.30 ALCOHOLIC CIRRHOSIS: Status: ACTIVE | Noted: 2020-01-01

## 2020-11-09 NOTE — NURSING
Pt received to ICU-11 via stretcher from ER. Pt sedated/intubated. No family present.   Unable to complete admission r/t pt condition.    Pics of wounds placed on chart and wounds dressed.

## 2020-11-09 NOTE — ASSESSMENT & PLAN NOTE
Chronic, possible new source of infection causing syncope/collapse  Daily dressing changes  Culture any fluid expression

## 2020-11-09 NOTE — ED PROVIDER NOTES
Encounter Date: 11/9/2020       History     Chief Complaint   Patient presents with    Altered Mental Status    Loss of Consciousness    Hypotension     65-year-old male with past medical history significant for alcoholic cirrhosis, COPD, lung cancer in remission by report, PVD with venous stasis dermatitis presents to the ED via EMS for emergent evaluation of reported syncopal and collapse. Per EMS, the patient was found down at home for unknown amount of time.     Upon arrival to the ED, he is lethargic but arousable, smells of urine, hypotensive, tachypneic, and hypoxic.             Review of patient's allergies indicates:   Allergen Reactions    Gabapentin Other (See Comments)     Seeing spots and dizziness     Naproxen     Nsaids (non-steroidal anti-inflammatory drug)      Past Medical History:   Diagnosis Date    Cirrhosis     COPD (chronic obstructive pulmonary disease)     Fall     Lung cancer     PVD (peripheral vascular disease)     Tremor     Venous stasis dermatitis of both lower extremities      Past Surgical History:   Procedure Laterality Date    ABDOMINAL SURGERY      LUNG LOBECTOMY      THORACOTOMY       Family History   Family history unknown: Yes     Social History     Tobacco Use    Smoking status: Current Every Day Smoker     Years: 50.00     Types: Cigarettes    Smokeless tobacco: Never Used   Substance Use Topics    Alcohol use: Yes     Frequency: 4 or more times a week     Drinks per session: 10 or more     Binge frequency: Daily or almost daily    Drug use: Not Currently     Review of Systems   Unable to perform ROS: Mental status change       Physical Exam     Initial Vitals   BP Pulse Resp Temp SpO2   11/09/20 0902 11/09/20 0902 11/09/20 0902 11/09/20 1100 11/09/20 0902   (!) 144/83 87 (!) 26 97.8 °F (36.6 °C) 96 %      MAP       --                Physical Exam    Nursing note and vitals reviewed.  Constitutional: He appears well-developed. He is not diaphoretic. He  appears cachectic. He has a sickly appearance. He appears ill. He appears distressed. Nasal cannula in place.   Smells of urine   HENT:   Head: Normocephalic and atraumatic.   Right Ear: External ear normal.   Left Ear: External ear normal.   Nose: Nose normal.   Mouth/Throat: Oropharynx is clear and moist.   Eyes: Conjunctivae are normal. Pupils are equal, round, and reactive to light. No scleral icterus.   Neck: Normal range of motion. Neck supple. No JVD present.   Cardiovascular: Normal rate, regular rhythm, normal heart sounds and intact distal pulses.   Pulmonary/Chest: Accessory muscle usage present. Tachypnea noted. He is in respiratory distress. He has no wheezes. He has rhonchi. He has no rales. He exhibits no tenderness.   Abdominal: Soft. Bowel sounds are normal. He exhibits no distension. There is no abdominal tenderness. There is no rebound and no guarding.   Musculoskeletal: Normal range of motion. No tenderness or edema.   Lymphadenopathy:     He has no cervical adenopathy.   Neurological: He is alert. He has normal reflexes. GCS eye subscore is 4. GCS verbal subscore is 4. GCS motor subscore is 6.   Skin: Skin is warm and dry. Capillary refill takes less than 2 seconds. No rash and no abscess noted. There is erythema. No pallor.         ED Course   Central Line    Date/Time: 11/9/2020 11:43 AM  Performed by: Nayeli Kessler MD  Authorized by: Nayeli Kessler MD     Location procedure was performed:  [facility] EMERGENCY DEPARTMENT  Consent Done ?:  Emergent Situation  Time out complete?: Verified correct patient, procedure, equipment, staff, and site/side    Indications:  Med administration and hemodynamic monitoring  Anesthesia:  Local infiltration  Local anesthetic:  Lidocaine 1% without epinephrine  Anesthetic total (ml):  3  Preparation:  Skin prepped with ChloraPrep  Skin prep agent dried: Skin prep agent completely dried prior to procedure    Sterile barriers: All five maximal sterile barriers  used - gloves, gown, cap, mask and large sterile sheet    Hand hygiene: Hand hygiene performed immediately prior to central venous catheter insertion    Location:  Right femoral  Site selection rationale:  Emergent  Catheter type:  Triple lumen  Catheter size:  7 Fr  Inserted Catheter Length (cm):  16  Ultrasound guidance: Yes    Vessel Caliber:  Medium   patent  Comprressibility:  Poor  Needle advanced into vessel with real time ultrasound guidance.    Guidewire confirmed in vessel.    Image recorded and saved.    Steril sheath on probe.    Sterile gel used.  Manometry: No    Number of attempts:  1  Securement:  Line sutured, chlorhexidine patch, sterile dressing applied and blood return through all ports  Complications: No    Intubation    Date/Time: 11/9/2020 12:24 PM  Location procedure was performed: [facility] EMERGENCY DEPARTMENT  Performed by: Nayeli Kessler MD  Authorized by: Nayeli Kessler MD   Consent Done: Emergent Situation  Indications: respiratory failure,  hypoxemia and  hypercapnia  Intubation method: direct  Patient status: paralyzed (RSI)  Preoxygenation: BVM  Sedatives: etomidate  Paralytic: succinylcholine  Laryngoscope size: Mac 4  Tube size: 8.0 mm  Tube type: cuffed  Number of attempts: 1  Ventilation between attempts: BVM  Cricoid pressure: no  Cords visualized: yes  Post-procedure assessment: ETCO2 monitor and chest rise  Breath sounds: clear  Cuff inflated: yes  ETT to teeth: 24 cm  Tube secured with: ETT marshall  Chest x-ray interpreted by me and radiologist.  Chest x-ray findings: endotracheal tube in appropriate position  Patient tolerance: Patient tolerated the procedure well with no immediate complications  Complications: No    Critical Care    Date/Time: 11/9/2020 8:59 AM  Performed by: Nayeli Kessler MD  Authorized by: Nayeli Kessler MD   Direct patient critical care time: 145 minutes  Additional history critical care time: 30 minutes  Ordering / reviewing critical care time: 45  minutes  Documentation critical care time: 36 minutes  Consulting other physicians critical care time: 20 minutes  Consult with family critical care time: 35 minutes  Total critical care time (exclusive of procedural time) : 311 minutes  Critical care time was exclusive of separately billable procedures and treating other patients and teaching time.  Critical care was necessary to treat or prevent imminent or life-threatening deterioration of the following conditions: CNS failure or compromise, sepsis and respiratory failure.  Critical care was time spent personally by me on the following activities: discussions with consultants, evaluation of patient's response to treatment, obtaining history from patient or surrogate, ordering and review of laboratory studies, pulse oximetry, blood draw for specimens, review of old charts, vascular access procedures, ventilator management, re-evaluation of patient's condition, ordering and review of radiographic studies, ordering and performing treatments and interventions, examination of patient, interpretation of cardiac output measurements and development of treatment plan with patient or surrogate.        Labs Reviewed   CBC W/ AUTO DIFFERENTIAL - Abnormal; Notable for the following components:       Result Value    WBC 3.56 (*)     RBC 3.61 (*)     Hemoglobin 11.7 (*)     Hematocrit 33.0 (*)     MCH 32.4 (*)     Platelets 50 (*)     Immature Granulocytes 0.6 (*)     Lymph # 0.3 (*)     Gran % 76.7 (*)     Lymph % 9.6 (*)     All other components within normal limits    Narrative:     Recoll. [id number] by [facility] at 11/09/2020 10:01, reason: Possible IV   fluid contamination.   COMPREHENSIVE METABOLIC PANEL - Abnormal; Notable for the following components:    Sodium 117 (*)     Potassium 2.9 (*)     Chloride 72 (*)     Glucose 112 (*)     Calcium 7.2 (*)     Total Protein 5.3 (*)     Albumin 2.6 (*)     Total Bilirubin 1.7 (*)      (*)      (*)     All other  components within normal limits    Narrative:     Sodium and chloride critical result(s) called and verbal readback   obtained from Brandyn Cornejo RN ED.  by [facility] 11/09/2020 10:49  Recoll. [id number] by [facility] at 11/09/2020 10:01, reason: Possible IV   fluid contamination.   C-REACTIVE PROTEIN - Abnormal; Notable for the following components:    CRP 5.27 (*)     All other components within normal limits    Narrative:     Recoll. [id number] by [facility] at 11/09/2020 10:01, reason: Possible IV   fluid contamination.   LACTATE DEHYDROGENASE - Abnormal; Notable for the following components:     (*)     All other components within normal limits    Narrative:     Recoll. [id number] by [facility] at 11/09/2020 10:01, reason: Possible IV   fluid contamination.   CK - Abnormal; Notable for the following components:    CPK 7086 (*)     All other components within normal limits    Narrative:     Recoll. [id number] by [facility] at 11/09/2020 10:01, reason: Possible IV   fluid contamination.   URINALYSIS, REFLEX TO URINE CULTURE - Abnormal; Notable for the following components:    Protein, UA 1+ (*)     Ketones, UA 2+ (*)     Occult Blood UA 3+ (*)     Nitrite, UA Positive (*)     Leukocytes, UA Trace (*)     All other components within normal limits    Narrative:     Specimen Source->Urine   PROTIME-INR - Abnormal; Notable for the following components:    Prothrombin Time 12.6 (*)     INR 1.3 (*)     All other components within normal limits    Narrative:     Recoll. [id number] by [facility] at 11/09/2020 10:01, reason: Possible IV   fluid contamination.   B-TYPE NATRIURETIC PEPTIDE - Abnormal; Notable for the following components:     (*)     All other components within normal limits    Narrative:     Recoll. [id number] by [facility] at 11/09/2020 10:01, reason: Possible IV   fluid contamination.   CK - Abnormal; Notable for the following components:    CPK 7086 (*)     All other components within normal limits    Narrative:     Recoll.  [id number] by [facility] at 11/09/2020 10:01, reason: Possible IV   fluid contamination.   SALICYLATE LEVEL - Abnormal; Notable for the following components:    Salicylate Lvl <4.0 (*)     All other components within normal limits    Narrative:     Recoll. [id number] by [facility] at 11/09/2020 10:01, reason: Possible IV   fluid contamination.   URINALYSIS MICROSCOPIC - Abnormal; Notable for the following components:    RBC, UA 10 (*)     WBC, UA 18 (*)     Bacteria Moderate (*)     All other components within normal limits    Narrative:     Specimen Source->Urine   LACTIC ACID, PLASMA - Abnormal; Notable for the following components:    Lactate (Lactic Acid) 2.5 (*)     All other components within normal limits   POCT GLUCOSE - Abnormal; Notable for the following components:    POCT Glucose 139 (*)     All other components within normal limits   ISTAT PROCEDURE - Abnormal; Notable for the following components:    POC PH 7.329 (*)     POC PCO2 60.5 (*)     POC PO2 143 (*)     POC HCO3 31.8 (*)     POC TCO2 34 (*)     All other components within normal limits   ISTAT PROCEDURE - Abnormal; Notable for the following components:    POC PCO2 55.0 (*)     POC PO2 77 (*)     POC HCO3 30.4 (*)     POC SATURATED O2 94 (*)     POC TCO2 32 (*)     All other components within normal limits   SARS-COV-2 RNA AMPLIFICATION, QUAL   LACTIC ACID, PLASMA    Narrative:     Recoll. [id number] by [facility] at 11/09/2020 10:01, reason: Possible IV   fluid contamination.   APTT    Narrative:     Recoll. [id number] by [facility] at 11/09/2020 10:01, reason: Possible IV   fluid contamination.   TROPONIN I    Narrative:     Recoll. [id number] by [facility] at 11/09/2020 10:01, reason: Possible IV   fluid contamination.   DRUG SCREEN PANEL, URINE EMERGENCY    Narrative:     Specimen Source->Urine   AMMONIA    Narrative:     Recoll. [id number] by [facility] at 11/09/2020 10:01, reason: Possible IV   fluid contamination.   ALCOHOL,MEDICAL (ETHANOL)    Narrative:     Recoll.  [id number] by [facility] at 11/09/2020 10:01, reason: Possible IV   fluid contamination.   ACETAMINOPHEN LEVEL    Narrative:     Recoll. [id number] by [facility] at 11/09/2020 10:01, reason: Possible IV   fluid contamination.     EKG Readings: (Independently Interpreted)   Initial Reading: No STEMI. Rhythm: Normal Sinus Rhythm. Heart Rate: 89. Ectopy: No Ectopy. Conduction: Normal. ST Segments: Normal ST Segments. T Waves: Normal. Axis: Normal. Clinical Impression: Normal Sinus Rhythm     ECG Results          EKG 12-lead (Final result)  Result time 11/09/20 11:35:11    Final result by Interface, Lab In [facility] (11/09/20 11:35:11)                 Narrative:    Test Reason : R41.82,    Vent. Rate : 089 BPM     Atrial Rate : 089 BPM     P-R Int : 130 ms          QRS Dur : 086 ms      QT Int : 330 ms       P-R-T Axes : 070 068 048 degrees     QTc Int : 401 ms    Normal sinus rhythm  Possible Left atrial enlargement  Borderline Abnormal ECG  No previous ECGs available  Confirmed by Tomy Garcia MD ([id number]) on 11/9/2020 11:35:02 AM    Referred By: [email or web address]   SELF           Confirmed By:Tomy Garcia MD                            Imaging Results          X-Ray Abdomen AP 1 View (KUB) (Final result)  Result time 11/09/20 13:14:23    Final result by Antonio Simeon MD (11/09/20 13:14:23)                 Impression:      1. No plain film evidence for obstruction.  2. Right femoral line.      Electronically signed by: Antonio Simeon  Date:    11/09/2020  Time:    13:14             Narrative:    EXAMINATION:  XR ABDOMEN AP 1 VIEW    CLINICAL HISTORY:  Encounter for adjustment and management of vascular access device    TECHNIQUE:  Single spine view of the abdomen.    COMPARISON:  None    FINDINGS:  Air and stool throughout the colon and rectum.  No plain film evidence for bowel obstruction.    No significant intra-abdominal calcifications.    Bony pelvis is intact.  Right femoral line in place terminating at the level of the distal right common  iliac artery.                               X-Ray Chest 1 View for Line/Tube Placement (Final result)  Result time 11/09/20 13:13:22    Final result by Antonio Simeon MD (11/09/20 13:13:22)                 Impression:      1. Persistent elevation right hemidiaphragm with dependent right lower lobe atelectasis.  2. Endotracheal tube placement.  3. Nasogastric tube coiled in the proximal esophagus.  This needs to be repositioned.  Findings discussed with Dr. Kessler in the emergency room 13:10 hours 11/09/2020.      Electronically signed by: Antonio Simeon  Date:    11/09/2020  Time:    13:13             Narrative:    EXAMINATION:  XR CHEST 1 VIEW FOR LINE/TUBE PLACEMENT    CLINICAL HISTORY:  intubation;    TECHNIQUE:  Single frontal portable view of the chest was performed.    COMPARISON:  Earlier same day.    FINDINGS:  Persistent elevation right hemidiaphragm with dependent right lower lobe atelectasis.  The left lung is clear.  Heart size is normal.    There is been placement of endotracheal tube with the tip in the distal 3rd trachea terminating at the level of the clavicular heads.  There is partial visualization a nasogastric tube coiled within the upper esophagus.                               CT Head Without Contrast (Final result)  Result time 11/09/20 13:06:42    Final result by Antonio Simeon MD (11/09/20 13:06:42)                 Impression:      Cortical atrophy with periventricular deep white matter change consistent with chronic small vessel ischemic disease.      Electronically signed by: Antonio Simeon  Date:    11/09/2020  Time:    13:06             Narrative:    EXAMINATION:  CT HEAD WITHOUT CONTRAST    CLINICAL HISTORY:  Altered mental status;    TECHNIQUE:  Low dose axial images were obtained through the head.  Coronal and sagittal reformations were also performed. Contrast was not administered.    COMPARISON:  None.    FINDINGS:  There is no acute hemorrhage or infarction.  There is  cortical atrophy.  There are periventricular deep white matter changes consistent with chronic small vessel ischemic disease.    No extra-axial fluid collections.  Ventricles are normal in size, shape and configuration.  The basal cisterns are patent.    The imaged paranasal sinuses and ethmoid air cells are well aerated.  The mastoid air cells and middle ears are normally pneumatized.    Partial visualization of a catheter within the roof of the oropharynx.                               X-Ray Chest 1 View for Line/Tube Placement (Final result)  Result time 11/09/20 12:10:34    Final result by Antonio Simeon MD (11/09/20 12:10:34)                 Impression:      1. No acute chest disease.  2. Persistent moderate elevation right hemidiaphragm.      Electronically signed by: Antonio Simeon  Date:    11/09/2020  Time:    12:10             Narrative:    EXAMINATION:  XR CHEST 1 VIEW FOR LINE/TUBE PLACEMENT    CLINICAL HISTORY:  line placement;    TECHNIQUE:  Single frontal portable view of the chest was performed.    COMPARISON:  Earlier same day.    FINDINGS:  No significant interval change.  Persistent moderate elevation right hemidiaphragm with dependent right lower lobe atelectasis.  The left lung is clear.  Heart size normal.  Trachea midline.    Bony thorax intact.                               X-Ray Chest AP Portable (Final result)  Result time 11/09/20 09:53:26    Final result by Antonio Simeon MD (11/09/20 09:53:26)                 Impression:      1. Mild chronic interstitial change without focal consolidation.  2. Moderate elevation right hemidiaphragm.      Electronically signed by: Antonio Simeon  Date:    11/09/2020  Time:    09:53             Narrative:    EXAMINATION:  XR CHEST AP PORTABLE    CLINICAL HISTORY:  Suspected Covid-19 Virus Infection;    TECHNIQUE:  Single frontal view of the chest was performed.    COMPARISON:  None    FINDINGS:  There is mild chronic interstitial change.  Moderate  elevation right hemidiaphragm with dependent right lower lobe atelectasis.  No focal consolidation.  No significant pleural effusion.    Heart size is normal.  Calcified aortic plaque.  Trachea midline.    Bony thorax intact.                                 Medical Decision Making:   Differential Diagnosis:   Acute asthma exacerbation, acute COPD exacerbation, acute hypoxic respiratory failure, acute hypercapnic respiratory failure, acute hypoxic hypercapnic respiratory failure, acute respiratory distress, acute allergic reaction, acute pneumonia, aspiration, acute congestive heart failure, cardiomyopathy, congenital cardiovascular disorder, psychogenic, critical arrhythmia, acute anemia, acute pneumothorax, acute pleural effusion, COVID,Medication/polypharmacy induced psychosis, acute substance withdrawal, acute metabolic encephalopathy, acute hypoxia, acute hypoxic respiratory failure, acute hypercapnic respiratory failure, acute cerebrovascular accident, acute intracranial hemorrhage, acute increased intracranial pressure, hypoglycemia, acute hypoglycemic coma, seizure, uremia, psychogenic, sepsis, severe sepsis, septic shock, withdrawal     ED Management:  Sepsis protocol initiated on arrival due to reported hypotension prior to arrival.  GCS 14 on arrival.  Initial ABG reflects hypercarbia with an O2 of 148; this was done on 4 L nasal cannula.  Oxygen will be reduced with repeat ABG and approximately 1 hr to determine if BiPAP is appropriate. Due to continued hypotension, a right femoral central line was placed using ultrasound guidance with KUB confirmation of placement prior to use. After confirmation, the patient was started on Levophed support.   Unfortunately, the patient ultimately became more somnolent necessitating further intervention. Lengthy discussion with the daughter, who requests intubation but no CPR. As such, the patient was sedate with 20mg etomidate and 100mg succhylcholine, cords visualized  with glidoscope, and a 8.0 ETT placed without complication. CXR confirms placement.     CK elevated to 7000, CRP 5.27, , lactic acid 2.5, nitrite positive on urinalysis, UDS negative, LFTs elevated.   Hospital team called for admission for altered mental status with possible syncope, COPD exacerbation, UTI, sepsis, and acute hypoxic respiratory failure.                      ED Course as of Nov 15 1516   Mon Nov 09, 2020   1456 Delayed admission due to ICU staffing    [MM]      ED Course User Index  [MM] Nayeli Kessler MD            Clinical Impression:       ICD-10-CM ICD-9-CM   1. Syncope and collapse  R55 780.2   2. Suspected COVID-19 virus infection  Z20.828 V01.79   3. Altered mental status  R41.82 780.97   4. Hypotension, unspecified hypotension type  I95.9 458.9   5. Acute respiratory failure with hypoxia and hypercapnia  J96.01 518.81    J96.02    6. Acute cystitis with hematuria  N30.01 595.0   7. Hyponatremia  E87.1 276.1   8. Sepsis, due to unspecified organism, unspecified whether acute organ dysfunction present  A41.9 038.9     995.91   9. Non-traumatic rhabdomyolysis  M62.82 728.88   10. Encounter for intravenous line placement  Z45.2 V58.81   11. Intubation granuloma  T85.79XA 996.69     E879.8                      Disposition:   Disposition: Admitted  Condition: Critical     ED Disposition Condition    Admit                             Nayeli Kessler MD  11/15/20 1518

## 2020-11-09 NOTE — ASSESSMENT & PLAN NOTE
Severe hyponatremia - sodium 117 on admission  2L fluid bolus in ER  Check BMP q4h until >125  Lab work and history suggests dehydration  IV fluid hydration with NS   Admit to ICU for continuous cardiac monitoring

## 2020-11-10 NOTE — H&P
[facility] - ICU Hospital Medicine  History & Physical    Patient Name: David Mcintosh  MRN: [id number]  Admission Date: 11/9/2020  Attending Physician: Lolly Goff MD   Primary Care Provider: Janey Garcia MD         Patient information was obtained from ER records.     Subjective:     Principal Problem:Hyponatremia    Chief Complaint:   Chief Complaint   Patient presents with    Altered Mental Status    Loss of Consciousness    Hypotension        HPI: Patient is a 65-year-old male who presents to the ER by ambulance for questionable syncope and collapse with possible fall.  History obtained per ER notes.  Patient found lethargic at home, possibly at home.  Unknown preceding event.  Has a history of lung cancer, COPD, falls, peripheral vascular disease, tremors, venous stasis dermatitis of the lower extremities.  In the ER, CK elevated to 7000, CRP 5.27, , lactic acid 2.5, nitrite positive on urinalysis, UDS negative, LFTs elevated. Hospital team called for admission for altered mental status with possible syncope, COPD exacerbation, UTI.     Past Medical History:   Diagnosis Date    Cirrhosis     COPD (chronic obstructive pulmonary disease)     Fall     Lung cancer     PVD (peripheral vascular disease)     Tremor     Venous stasis dermatitis of both lower extremities        Past Surgical History:   Procedure Laterality Date    ABDOMINAL SURGERY      LUNG LOBECTOMY      THORACOTOMY         Review of patient's allergies indicates:   Allergen Reactions    Gabapentin Other (See Comments)     Seeing spots and dizziness     Naproxen     Nsaids (non-steroidal anti-inflammatory drug)        No current facility-administered medications on file prior to encounter.      Current Outpatient Medications on File Prior to Encounter   Medication Sig    albuterol (PROAIR HFA) 90 mcg/actuation inhaler Inhale 2 puffs into the lungs every 6 (six) hours as needed for Wheezing or  Shortness of Breath.    primidone (MYSOLINE) 250 MG Tab Take 500 mg by mouth.     Family History     Family history is unknown by patient.        Tobacco Use    Smoking status: Current Every Day Smoker     Years: 50.00     Types: Cigarettes    Smokeless tobacco: Never Used   Substance and Sexual Activity    Alcohol use: Yes     Frequency: 4 or more times a week     Drinks per session: 10 or more     Binge frequency: Daily or almost daily    Drug use: Not Currently    Sexual activity: Not Currently     Review of Systems   Unable to perform ROS: Intubated     Objective:     Vital Signs (Most Recent):  Temp: 97.5 °F (36.4 °C) (11/09/20 1700)  Pulse: 79 (11/09/20 1730)  Resp: 12 (11/09/20 1730)  BP: 101/68 (11/09/20 1730)  SpO2: 96 % (11/09/20 1730) Vital Signs (24h Range):  Temp:  [97.5 °F (36.4 °C)-97.8 °F (36.6 °C)] 97.5 °F (36.4 °C)  Pulse:  [76-96] 79  Resp:  [7-28] 12  SpO2:  [95 %-100 %] 96 %  BP: ()/(26-91) 101/68     Weight: 66.5 kg (146 lb 9.7 oz)  Body mass index is 19.34 kg/m².    Physical Exam  Vitals signs reviewed.   Constitutional:       Comments: Intubated, sedated   HENT:      Head: Normocephalic and atraumatic.      Right Ear: External ear normal.      Left Ear: External ear normal.      Nose: Nose normal.      Mouth/Throat:      Mouth: Mucous membranes are dry.   Cardiovascular:      Rate and Rhythm: Normal rate and regular rhythm.      Pulses: Normal pulses.      Heart sounds: No murmur. No gallop.    Pulmonary:      Comments: Mechanically ventilated, end expiratory wheezing, no crackles  Abdominal:      General: Bowel sounds are normal.      Comments: Scaphoid abdomen   Musculoskeletal:      Right lower leg: No edema.      Left lower leg: No edema.      Comments: Right lower extremity with dressing c/d/i   Skin:     General: Skin is warm and dry.      Findings: Bruising (significant bruising) present.   Neurological:      Comments: As below   Psychiatric:      Comments:  Intubated/sedated                       Significant Labs:   Recent Lab Results       11/09/20  1348   11/09/20  1155   11/09/20  1017   11/09/20  1008   11/09/20  0941        Benzodiazepines               Phencyclidine               Acetaminophen (Tylenol), Serum     <10.0  Comment:  Toxic Levels:  Adults (4 hr post-ingestion).........>150 ug/mL  Adults (12 hr post-ingestion)........>40 ug/mL  Peds (2 hr post-ingestion, liquid)...>225 ug/mL           Albumin     2.6         Alcohol, Serum     <5         Alkaline Phosphatase     86         Allens Test   Pass   Pass       ALT     161         Ammonia     31         Amphetamine Screen, Ur               Anion Gap     16         Appearance, UA               aPTT     29.4         AST     484         Bacteria, UA               Barbiturate Screen, Ur               Baso #     0.01         Basophil %     0.3         Bilirubin (UA)               BILIRUBIN TOTAL     1.7  Comment:  For infants and newborns, interpretation of results should be based  on gestational age, weight and in agreement with clinical  observations.  Premature Infant recommended reference ranges:  Up to 24 hours.............<8.0 mg/dL  Up to 48 hours............<12.0 mg/dL  3-5 days..................<15.0 mg/dL  6-29 days.................<15.0 mg/dL           BNP     122  Comment:  Values of less than 100 pg/ml are consistent with non-CHF populations.         Site   RR   RR       BUN     10         Calcium     7.2         Chloride     72  Comment:  Results confirmed, test repeated  Sodium and chloride critical result(s) called and verbal readback   obtained from Brandyn Cornejo RN ED.  by [facility] [place], [place] 11/09/2020 10:49           CO2     29         Cocaine (Metab.)               Color, UA               CPK     7086              7086         Creatinine     0.5         Creatinine, Urine               CRP     5.27         DelSys   CPAP/BiPAP   Nasal Can       Differential Method     Automated         eGFR if   American     >60.0         eGFR if non      >60.0  Comment:  Calculation used to obtain the estimated glomerular filtration  rate (eGFR) is the CKD-EPI equation.            Eos #     0.0         Eosinophil %     0.3         EP   6           FiO2   40   32       Flow       3       Glucose     112         Glucose, UA               Gran # (ANC)     2.6         Gran %     76.7         Hematocrit     33.0         Hemoglobin     11.7         Hyaline Casts, UA               Immature Grans (Abs)     0.02  Comment:  Mild elevation in immature granulocytes is non specific and   can be seen in a variety of conditions including stress response,   acute inflammation, trauma and pregnancy. Correlation with other   laboratory and clinical findings is essential.           Immature Granulocytes     0.6         INR     1.3  Comment:  Coumadin Therapy:  2.0 - 3.0 for INR for all indicators except mechanical heart valves  and antiphospholipid syndromes which should use 2.5 - 3.5.           IP   12           Ketones, UA               Lactate, Sebastian 2.5  Comment:  Falsely low lactic acid results can be found in samples   containing >=13.0 mg/dL total bilirubin and/or >=3.5 mg/dL   direct bilirubin.     1.4  Comment:  Falsely low lactic acid results can be found in samples   containing >=13.0 mg/dL total bilirubin and/or >=3.5 mg/dL   direct bilirubin.           LD     517  Comment:  Results are increased in hemolyzed samples.         Leukocytes, UA               Lymph #     0.3         Lymph %     9.6         MCH     32.4         MCHC     35.5         MCV     91         Microscopic Comment               Mode   BiPAP   SPONT       Mono #     0.4         Mono %     12.5         MPV     10.7         NITRITE UA               nRBC     0         Occult Blood UA               Opiate Scrn, Ur               pH, UA               Platelets     50         POC BE   5   6       POC HCO3   30.4   31.8       POC PCO2   55.0   60.5        POC PH   7.351   7.329       POC PO2   77   143       POC SATURATED O2   94   99       POC TCO2   32   34       POCT Glucose         139     Potassium     2.9         PROTEIN TOTAL     5.3         Protein, UA               Protime     12.6         RBC     3.61         RBC, UA               RDW     13.3         Salicylate Lvl     <4.0  Comment:  Toxic:  30.0 - 70.0 mg/dl  Lethal: >70.0 mg/dl           Sample   ARTERIAL   ARTERIAL       SARS-CoV-2 RNA, Amplification, Qual               Sodium     117  Comment:  Results confirmed, test repeated  Sodium and chloride critical result(s) called and verbal readback   obtained from Brandyn Cornejo RN ED.  by [facility] [place] 11/09/2020 10:49           Specific [place], UA               Specimen UA               Marijuana (THC) Metabolite               Toxicology Information               Troponin I     0.04         UROBILINOGEN UA               WBC, UA               WBC     3.56                          11/09/20  0938        Benzodiazepines Negative     Phencyclidine Negative     Acetaminophen (Tylenol), Serum       Albumin       Alcohol, Serum       Alkaline Phosphatase       Allens Test       ALT       Ammonia       Amphetamine Screen, Ur Negative     Anion Gap       Appearance, UA Clear     aPTT       AST       Bacteria, UA Moderate     Barbiturate Screen, Ur Presumptive Positive     Baso #       Basophil %       Bilirubin (UA) Negative     BILIRUBIN TOTAL       BNP       Site       BUN       Calcium       Chloride       CO2       Cocaine (Metab.) Negative     Color, UA Yellow     CPK       Creatinine       Creatinine, Urine 67.3  Comment:  The random urine reference ranges provided were established   for 24 hour urine collections.  No reference ranges exist for  random urine specimens.  Correlate clinically.       CRP       DelSys       Differential Method       eGFR if        eGFR if non        Eos #       Eosinophil %       EP       FiO2       Flow        Glucose       Glucose, UA Negative     Gran # (ANC)       Gran %       Hematocrit       Hemoglobin       Hyaline Casts, UA 0     Immature Grans (Abs)       Immature Granulocytes       INR       IP       Ketones, UA 2+     Lactate, Sebastian       LD       Leukocytes, UA Trace     Lymph #       Lymph %       MCH       MCHC       MCV       Microscopic Comment SEE COMMENT  Comment:  Other formed elements not mentioned in the report are not   present in the microscopic examination.        Mode       Mono #       Mono %       MPV       NITRITE UA Positive     nRBC       Occult Blood UA 3+     Opiate Scrn, Ur Negative     pH, UA 7.0     Platelets       POC BE       POC HCO3       POC PCO2       POC PH       POC PO2       POC SATURATED O2       POC TCO2       POCT Glucose       Potassium       PROTEIN TOTAL       Protein, UA 1+  Comment:  Recommend a 24 hour urine protein or a urine   protein/creatinine ratio if globulin induced proteinuria is  clinically suspected.       Protime       RBC       RBC, UA 10     RDW       Salicylate Lvl       Sample       SARS-CoV-2 RNA, Amplification, Qual Negative  Comment:  This test utilizes isothermal nucleic acid amplification   technology to detect the SARS-CoV-2 RdRp nucleic acid segment.   The analytical sensitivity (limit of detection) is 125 genome   equivalents/mL.   A POSITIVE result implies infection with the SARS-CoV-2 virus;  the patient is presumed to be contagious.    A NEGATIVE result means that SARS-CoV-2 nucleic acids are not  present above the limit of detection. A NEGATIVE result should be   treated as presumptive. It does not rule out the possibility of   COVID-19 and should not be the sole basis for treatment decisions.   If COVID-19 is strongly suspected based on clinical and exposure   history, re-testing using an alternate molecular assay should be   considered.   This test is only for use under the Food and Drug   Administration s Emergency Use Authorization  (EUA).   Commercial kits are provided by [employer].   Performance characteristics of the EUA have been independently  verified by [facility] Department of  Pathology and Laboratory Medicine.   _________________________________________________________________  The ID NOW COVID-19 Letter of Authorization, along with the   authorized Fact Sheet for Healthcare Providers, the authorized Fact  Sheet for Patients, and authorized labeling are available on the FDA   website:  www.fda.gov/MedicalDevices/Safety/EmergencySituations/dio298218.htm       Sodium       Specific Gravity, UA 1.020     Specimen UA Urine, Clean Catch     Marijuana (THC) Metabolite Negative     Toxicology Information SEE COMMENT  Comment:  This screen includes the following classes of drugs at the   listed cut-off:  Benzodiazepines                  200 ng/ml  Cocaine metabolite               300 ng/ml  Opiates                         2000 ng/ml  Barbiturates                     200 ng/ml  Amphetamines                    1000 ng/ml  Marijuana metabs (THC)            50 ng/ml  Phencyclidine (PCP)               25 ng/ml  High concentrations of Methylenedioxymethamphetamine (MDMA aka  Ectasy) and other structurally similar compounds may cross-   react with the Amphetamine/Methamphetamine screening   immunoassay giving a false positive result.  Note: This exception list includes only more common   interferants in toxicology screen testing.  Because of many   cross-reactantspositive results on toxicology drug screens   should be confirmed whenever results do not correlate with   clinical presentation.  This report is intended for use in clinical monitoring and  management of patients. It is not intended for use in   employment related drug testing.  Because of any cross-reactants, positive results on toxicology  drug screens should be confirmed whenever results do not  correlate with clinical presentation.  Presumptive positive results are  unconfirmed and may be used   only for medical purposes.       Troponin I       UROBILINOGEN UA 1.0     WBC, UA 18     WBC           All pertinent labs within the past 24 hours have been reviewed.    Significant Imaging: I have reviewed all pertinent imaging results/findings within the past 24 hours.    Assessment/Plan:     * Hyponatremia  Severe hyponatremia - sodium 117 on admission  2L fluid bolus in ER  Check BMP q4h until >125  Lab work and history suggests dehydration  IV fluid hydration with NS   Admit to ICU for continuous cardiac monitoring      Hypotension  Possibly 2/2 sedation vs sepsis vs dehydration  Aggressive IV fluid resuscitation as tolerated      Acute cystitis without hematuria  Started on Rocephin 1g IV q24h in ER  Will continue on admission      Alcoholic cirrhosis  Likely source of elevated LFTs  Trend CMP daily        Traumatic rhabdomyolysis  Likely 2/2 muscle degradation after lying on the floor from fall  Trend CK  IV fluid hydration aggressive as tolerated      Purpura  Significant on all extremities  Likely sequela of liver disease   Monitor platelets      Thrombocytopenia  Unknown baseline  2/2 liver cirrhosis/failure vs cancer or other  Trend daily  Transfuse for plts <10,000 or higher thresholds with bleeding         Hypokalemia  Severe at 2.9  Replace parenterally  Likely 2/2 malnutrition/dehydration      Syncope and collapse  Questionable occurrence       Chronic obstructive pulmonary disease  Continue scheduled duoneb treatments      Wound of right leg  Chronic, possible new source of infection causing syncope/collapse  Daily dressing changes  Culture any fluid expression        VTE Risk Mitigation (From admission, onward)         Ordered     Place RAULITO hose  Until discontinued      11/09/20 1821     Reason for No Pharmacological VTE Prophylaxis  Once     Question:  Reasons:  Answer:  Thrombocytopenia    11/09/20 1821     IP VTE HIGH RISK PATIENT  Once      11/09/20 1821     Place  sequential compression device  Until discontinued      11/09/20 1821                   Lolly Goff MD  Department of Hospital Medicine   [facility] - [facility]

## 2020-11-10 NOTE — NURSING
Discussed with Dr. Goff information reported by family of patient being an alcoholic for his entire life. Daughter stating that she is 45 years old and has never seen her father sober. Discussed potential seizure activity with alcohol withdrawal. Awaiting further orders.

## 2020-11-10 NOTE — PLAN OF CARE
Problem: Communication Impairment (Mechanical Ventilation, Invasive)  Goal: Effective Communication  Outcome: Ongoing, Not Progressing     Problem: Device-Related Complication Risk (Mechanical Ventilation, Invasive)  Goal: Optimal Device Function  Outcome: Ongoing, Not Progressing     Problem: Inability to Wean (Mechanical Ventilation, Invasive)  Goal: Mechanical Ventilation Liberation  Outcome: Ongoing, Not Progressing     Problem: Nutrition Impairment (Mechanical Ventilation, Invasive)  Goal: Optimal Nutrition Delivery  Outcome: Ongoing, Not Progressing

## 2020-11-10 NOTE — SUBJECTIVE & OBJECTIVE
Past Medical History:   Diagnosis Date    Cirrhosis     COPD (chronic obstructive pulmonary disease)     Fall     Lung cancer     PVD (peripheral vascular disease)     Tremor     Venous stasis dermatitis of both lower extremities        Past Surgical History:   Procedure Laterality Date    ABDOMINAL SURGERY      LUNG LOBECTOMY      THORACOTOMY         Review of patient's allergies indicates:   Allergen Reactions    Gabapentin Other (See Comments)     Seeing spots and dizziness     Naproxen     Nsaids (non-steroidal anti-inflammatory drug)        No current facility-administered medications on file prior to encounter.      Current Outpatient Medications on File Prior to Encounter   Medication Sig    albuterol (PROAIR HFA) 90 mcg/actuation inhaler Inhale 2 puffs into the lungs every 6 (six) hours as needed for Wheezing or Shortness of Breath.    primidone (MYSOLINE) 250 MG Tab Take 500 mg by mouth.     Family History     Family history is unknown by patient.        Tobacco Use    Smoking status: Current Every Day Smoker     Years: 50.00     Types: Cigarettes    Smokeless tobacco: Never Used   Substance and Sexual Activity    Alcohol use: Yes     Frequency: 4 or more times a week     Drinks per session: 10 or more     Binge frequency: Daily or almost daily    Drug use: Not Currently    Sexual activity: Not Currently     Review of Systems   Unable to perform ROS: Intubated     Objective:     Vital Signs (Most Recent):  Temp: 97.5 °F (36.4 °C) (11/09/20 1700)  Pulse: 79 (11/09/20 1730)  Resp: 12 (11/09/20 1730)  BP: 101/68 (11/09/20 1730)  SpO2: 96 % (11/09/20 1730) Vital Signs (24h Range):  Temp:  [97.5 °F (36.4 °C)-97.8 °F (36.6 °C)] 97.5 °F (36.4 °C)  Pulse:  [76-96] 79  Resp:  [7-28] 12  SpO2:  [95 %-100 %] 96 %  BP: ()/(26-91) 101/68     Weight: 66.5 kg (146 lb 9.7 oz)  Body mass index is 19.34 kg/m².    Physical Exam  Vitals signs reviewed.   Constitutional:       Comments: Intubated,  sedated   HENT:      Head: Normocephalic and atraumatic.      Right Ear: External ear normal.      Left Ear: External ear normal.      Nose: Nose normal.      Mouth/Throat:      Mouth: Mucous membranes are dry.   Cardiovascular:      Rate and Rhythm: Normal rate and regular rhythm.      Pulses: Normal pulses.      Heart sounds: No murmur. No gallop.    Pulmonary:      Comments: Mechanically ventilated, end expiratory wheezing, no crackles  Abdominal:      General: Bowel sounds are normal.      Comments: Scaphoid abdomen   Musculoskeletal:      Right lower leg: No edema.      Left lower leg: No edema.      Comments: Right lower extremity with dressing c/d/i   Skin:     General: Skin is warm and dry.      Findings: Bruising (significant bruising) present.   Neurological:      Comments: As below   Psychiatric:      Comments: Intubated/sedated                       Significant Labs:   Recent Lab Results       11/09/20  1348   11/09/20  1155   11/09/20  1017   11/09/20  1008   11/09/20  0941        Benzodiazepines               Phencyclidine               Acetaminophen (Tylenol), Serum     <10.0  Comment:  Toxic Levels:  Adults (4 hr post-ingestion).........>150 ug/mL  Adults (12 hr post-ingestion)........>40 ug/mL  Peds (2 hr post-ingestion, liquid)...>225 ug/mL           Albumin     2.6         Alcohol, Serum     <5         Alkaline Phosphatase     86         Allens Test   Pass   Pass       ALT     161         Ammonia     31         Amphetamine Screen, Ur               Anion Gap     16         Appearance, UA               aPTT     29.4         AST     484         Bacteria, UA               Barbiturate Screen, Ur               Baso #     0.01         Basophil %     0.3         Bilirubin (UA)               BILIRUBIN TOTAL     1.7  Comment:  For infants and newborns, interpretation of results should be based  on gestational age, weight and in agreement with clinical  observations.  Premature Infant recommended reference  ranges:  Up to 24 hours.............<8.0 mg/dL  Up to 48 hours............<12.0 mg/dL  3-5 days..................<15.0 mg/dL  6-29 days.................<15.0 mg/dL           BNP     122  Comment:  Values of less than 100 pg/ml are consistent with non-CHF populations.         Site   RR   RR       BUN     10         Calcium     7.2         Chloride     72  Comment:  Results confirmed, test repeated  Sodium and chloride critical result(s) called and verbal readback   obtained from Brandyn Cornejo RN ED.  by [facility] [place] 11/09/2020 10:49           CO2     29         Cocaine (Metab.)               Color, UA               CPK     7086              7086         Creatinine     0.5         Creatinine, Urine               CRP     5.27         DelSys   CPAP/BiPAP   Nasal Can       Differential Method     Automated         eGFR if      >60.0         eGFR if non      >60.0  Comment:  Calculation used to obtain the estimated glomerular filtration  rate (eGFR) is the CKD-EPI equation.            Eos #     0.0         Eosinophil %     0.3         EP   6           FiO2   40   32       Flow       3       Glucose     112         Glucose, UA               Gran # (ANC)     2.6         Gran %     76.7         Hematocrit     33.0         Hemoglobin     11.7         Hyaline Casts, UA               Immature Grans (Abs)     0.02  Comment:  Mild elevation in immature granulocytes is non specific and   can be seen in a variety of conditions including stress response,   acute inflammation, trauma and pregnancy. Correlation with other   laboratory and clinical findings is essential.           Immature Granulocytes     0.6         INR     1.3  Comment:  Coumadin Therapy:  2.0 - 3.0 for INR for all indicators except mechanical heart valves  and antiphospholipid syndromes which should use 2.5 - 3.5.           IP   12           Ketones, UA               Lactate, Sebastian 2.5  Comment:  Falsely low lactic acid results can be found  in samples   containing >=13.0 mg/dL total bilirubin and/or >=3.5 mg/dL   direct bilirubin.     1.4  Comment:  Falsely low lactic acid results can be found in samples   containing >=13.0 mg/dL total bilirubin and/or >=3.5 mg/dL   direct bilirubin.           LD     517  Comment:  Results are increased in hemolyzed samples.         Leukocytes, UA               Lymph #     0.3         Lymph %     9.6         MCH     32.4         MCHC     35.5         MCV     91         Microscopic Comment               Mode   BiPAP   SPONT       Mono #     0.4         Mono %     12.5         MPV     10.7         NITRITE UA               nRBC     0         Occult Blood UA               Opiate Scrn, Ur               pH, UA               Platelets     50         POC BE   5   6       POC HCO3   30.4   31.8       POC PCO2   55.0   60.5       POC PH   7.351   7.329       POC PO2   77   143       POC SATURATED O2   94   99       POC TCO2   32   34       POCT Glucose         139     Potassium     2.9         PROTEIN TOTAL     5.3         Protein, UA               Protime     12.6         RBC     3.61         RBC, UA               RDW     13.3         Salicylate Lvl     <4.0  Comment:  Toxic:  30.0 - 70.0 mg/dl  Lethal: >70.0 mg/dl           Sample   ARTERIAL   ARTERIAL       SARS-CoV-2 RNA, Amplification, Qual               Sodium     117  Comment:  Results confirmed, test repeated  Sodium and chloride critical result(s) called and verbal readback   obtained from Brandyn Cornejo RN ED.  by [facility] [place] 11/09/2020 10:49           Specific [place], UA               Specimen UA               Marijuana (THC) Metabolite               Toxicology Information               Troponin I     0.04         UROBILINOGEN UA               WBC, UA               WBC     3.56                          11/09/20  0938        Benzodiazepines Negative     Phencyclidine Negative     Acetaminophen (Tylenol), Serum       Albumin       Alcohol, Serum       Alkaline Phosphatase        Allens Test       ALT       Ammonia       Amphetamine Screen, Ur Negative     Anion Gap       Appearance, UA Clear     aPTT       AST       Bacteria, UA Moderate     Barbiturate Screen, Ur Presumptive Positive     Baso #       Basophil %       Bilirubin (UA) Negative     BILIRUBIN TOTAL       BNP       Site       BUN       Calcium       Chloride       CO2       Cocaine (Metab.) Negative     Color, UA Yellow     CPK       Creatinine       Creatinine, Urine 67.3  Comment:  The random urine reference ranges provided were established   for 24 hour urine collections.  No reference ranges exist for  random urine specimens.  Correlate clinically.       CRP       DelSys       Differential Method       eGFR if        eGFR if non        Eos #       Eosinophil %       EP       FiO2       Flow       Glucose       Glucose, UA Negative     Gran # (ANC)       Gran %       Hematocrit       Hemoglobin       Hyaline Casts, UA 0     Immature Grans (Abs)       Immature Granulocytes       INR       IP       Ketones, UA 2+     Lactate, Sebastian       LD       Leukocytes, UA Trace     Lymph #       Lymph %       MCH       MCHC       MCV       Microscopic Comment SEE COMMENT  Comment:  Other formed elements not mentioned in the report are not   present in the microscopic examination.        Mode       Mono #       Mono %       MPV       NITRITE UA Positive     nRBC       Occult Blood UA 3+     Opiate Scrn, Ur Negative     pH, UA 7.0     Platelets       POC BE       POC HCO3       POC PCO2       POC PH       POC PO2       POC SATURATED O2       POC TCO2       POCT Glucose       Potassium       PROTEIN TOTAL       Protein, UA 1+  Comment:  Recommend a 24 hour urine protein or a urine   protein/creatinine ratio if globulin induced proteinuria is  clinically suspected.       Protime       RBC       RBC, UA 10     RDW       Salicylate Lvl       Sample       SARS-CoV-2 RNA, Amplification, Qual  Negative  Comment:  This test utilizes isothermal nucleic acid amplification   technology to detect the SARS-CoV-2 RdRp nucleic acid segment.   The analytical sensitivity (limit of detection) is 125 genome   equivalents/mL.   A POSITIVE result implies infection with the SARS-CoV-2 virus;  the patient is presumed to be contagious.    A NEGATIVE result means that SARS-CoV-2 nucleic acids are not  present above the limit of detection. A NEGATIVE result should be   treated as presumptive. It does not rule out the possibility of   COVID-19 and should not be the sole basis for treatment decisions.   If COVID-19 is strongly suspected based on clinical and exposure   history, re-testing using an alternate molecular assay should be   considered.   This test is only for use under the Food and Drug   Administration s Emergency Use Authorization (EUA).   Commercial kits are provided by [employer].   Performance characteristics of the EUA have been independently  verified by [facility] Department of  Pathology and Laboratory Medicine.   _________________________________________________________________  The ID NOW COVID-19 Letter of Authorization, along with the   authorized Fact Sheet for Healthcare Providers, the authorized Fact  Sheet for Patients, and authorized labeling are available on the FDA   website:  www.fda.gov/MedicalDevices/Safety/EmergencySituations/dgp455053.htm       Sodium       Specific Gravity, UA 1.020     Specimen UA Urine, Clean Catch     Marijuana (THC) Metabolite Negative     Toxicology Information SEE COMMENT  Comment:  This screen includes the following classes of drugs at the   listed cut-off:  Benzodiazepines                  200 ng/ml  Cocaine metabolite               300 ng/ml  Opiates                         2000 ng/ml  Barbiturates                     200 ng/ml  Amphetamines                    1000 ng/ml  Marijuana metabs (THC)            50 ng/ml  Phencyclidine (PCP)                25 ng/ml  High concentrations of Methylenedioxymethamphetamine (MDMA aka  Ectasy) and other structurally similar compounds may cross-   react with the Amphetamine/Methamphetamine screening   immunoassay giving a false positive result.  Note: This exception list includes only more common   interferants in toxicology screen testing.  Because of many   cross-reactantspositive results on toxicology drug screens   should be confirmed whenever results do not correlate with   clinical presentation.  This report is intended for use in clinical monitoring and  management of patients. It is not intended for use in   employment related drug testing.  Because of any cross-reactants, positive results on toxicology  drug screens should be confirmed whenever results do not  correlate with clinical presentation.  Presumptive positive results are unconfirmed and may be used   only for medical purposes.       Troponin I       UROBILINOGEN UA 1.0     WBC, UA 18     WBC           All pertinent labs within the past 24 hours have been reviewed.    Significant Imaging: I have reviewed all pertinent imaging results/findings within the past 24 hours.

## 2020-11-10 NOTE — ASSESSMENT & PLAN NOTE
Unknown baseline  2/2 liver cirrhosis/failure vs cancer or other  Trend daily  Transfuse for plts <10,000 or higher thresholds with bleeding

## 2020-11-10 NOTE — HPI
Patient is a 65-year-old male who presents to the ER by ambulance for questionable syncope and collapse with possible fall.  History obtained per ER notes.  Patient found lethargic at home, possibly at home.  Unknown preceding event.  Has a history of lung cancer, COPD, falls, peripheral vascular disease, tremors, venous stasis dermatitis of the lower extremities.  In the ER, CK elevated to 7000, CRP 5.27, , lactic acid 2.5, nitrite positive on urinalysis, UDS negative, LFTs elevated. Hospital team called for admission for altered mental status with possible syncope, COPD exacerbation, UTI.

## 2020-11-10 NOTE — ASSESSMENT & PLAN NOTE
Likely 2/2 muscle degradation after lying on the floor from fall  Trend CK  IV fluid hydration aggressive as tolerated

## 2020-11-10 NOTE — PLAN OF CARE
Pt sedated throughout the night on vent.  Sr on tele.  Waiting for potassium riders to be completed before starting the every4 hour BMP's  Pillows used to shift weight.  Knee hi RAULITO and scd put on Left leg only due to right leg abrasions and dressings.  Urine Output dark nasreen.  Flushed NG tube with 30cc water

## 2020-11-11 PROBLEM — Z66 DNR (DO NOT RESUSCITATE): Status: ACTIVE | Noted: 2020-01-01

## 2020-11-11 PROBLEM — J18.9 PNEUMONIA: Status: ACTIVE | Noted: 2020-01-01

## 2020-11-11 PROBLEM — D61.818 PANCYTOPENIA: Status: ACTIVE | Noted: 2020-01-01

## 2020-11-11 PROBLEM — F10.10 ALCOHOL ABUSE: Status: ACTIVE | Noted: 2020-01-01

## 2020-11-11 PROBLEM — E43 PROTEIN-CALORIE MALNUTRITION, SEVERE: Status: ACTIVE | Noted: 2020-01-01

## 2020-11-11 NOTE — PLAN OF CARE
Pt BP continued to go up and down during the night heart rate stayed in the low 50's.  Complet bed bath with chlorhexadine done.  Ramirez care done.  Wound care performed to all wounds.

## 2020-11-11 NOTE — ACP (ADVANCE CARE PLANNING)
Advance Care Planning     Code Status  In light of the patients advanced and life limiting illness,I engaged the the family in a conversation about the patient's preferences for care  at the very end of life. The patient wishes to have a natural, peaceful death.  Along those lines, the patient does not wish to have CPR or other invasive treatments performed when his heart and/or breathing stops. I communicated to the family that a DNR order had been placed and daughter at bedside confirmed and would his medical record to reflect this preference.  I spent a total of 10 minutes engaging the patient in this advance care planning discussion.

## 2020-11-11 NOTE — SUBJECTIVE & OBJECTIVE
Interval History:   Worsening Na level, third spacing with hypoalbunemia - IV albumin     Daughter by bedside reported patient takes alcohol daily- Thiamnine   Blood cx NGTD, urine cx with enterococcus continue Ceftriaxone  CPK- down trending     Review of Systems   Unable to perform ROS: Intubated     Objective:     Vital Signs (Most Recent):  Temp: 97.6 °F (36.4 °C) (11/10/20 1600)  Pulse: (!) 56 (11/11/20 1000)  Resp: 16 (11/11/20 1000)  BP: 94/67 (11/11/20 1000)  SpO2: 100 % (11/11/20 1000) Vital Signs (24h Range):  Temp:  [97.5 °F (36.4 °C)-97.6 °F (36.4 °C)] 97.6 °F (36.4 °C)  Pulse:  [48-76] 56  Resp:  [15-16] 16  SpO2:  [100 %] 100 %  BP: ()/() 94/67     Weight: 66.5 kg (146 lb 9.7 oz)  Body mass index is 19.34 kg/m².    Intake/Output Summary (Last 24 hours) at 11/11/2020 1027  Last data filed at 11/11/2020 0600  Gross per 24 hour   Intake 1586.49 ml   Output 1400 ml   Net 186.49 ml      Physical Exam  Vitals signs reviewed.   Constitutional:       Comments: Intubated, sedated, cachectic   HENT:      Head: Normocephalic and atraumatic.   Cardiovascular:      Rate and Rhythm: Normal rate and regular rhythm.      Pulses: Normal pulses.      Heart sounds: No murmur. No gallop.    Pulmonary:      Comments: Mechanically ventilated, fair to moderate air movement, no wheezes, no rhonchi  Abdominal:      General: Bowel sounds are normal. There is no distension.      Tenderness: There is no abdominal tenderness. There is no guarding.   Musculoskeletal:      Right lower leg: No edema.      Left lower leg: No edema.   Skin:     General: Skin is warm and dry.         Significant Labs:   BMP:   Recent Labs   Lab 11/11/20  0753   *   *   K 4.0   CL 87*   CO2 24   BUN 6*   CREATININE <0.3*   CALCIUM 7.1*   MG 1.2*     CBC:   Recent Labs   Lab 11/10/20  0215 11/11/20  0434   WBC 1.94* 3.78*   HGB 10.9* 10.4*   HCT 29.2* 27.8*   PLT 45* 27*     CMP:   Recent Labs   Lab 11/10/20  0702  11/11/20  0013  11/11/20  0434 11/11/20  0753   *   < > 120* 120*  120* 118*   K 3.4*   < > 3.2* 4.0  4.0 4.0   CL 84*   < > 87* 90*  90* 87*   CO2 29   < > 26 22*  22* 24   GLU 98   < > 160* 170*  170* 185*   BUN 9   < > 6* 6*  6* 6*   CREATININE <0.3*   < > <0.3* <0.3*  <0.3* <0.3*   CALCIUM 7.1*   < > 7.2* 6.7*  6.7* 7.1*   PROT 4.0*  --   --  4.0*  --    ALBUMIN 2.0*  --   --  1.8*  --    BILITOT 1.2*  --   --  1.1*  --    ALKPHOS 62  --   --  66  --    *  --   --  175*  --    *  --   --  104*  --    ANIONGAP 7*   < > 7* 8  8 7*   EGFRNONAA >60.0   < > >60.0 >60.0  >60.0 >60.0    < > = values in this interval not displayed.     All pertinent labs within the past 24 hours have been reviewed.    Significant Imaging: I have reviewed all pertinent imaging results/findings within the past 24 hours.

## 2020-11-11 NOTE — ASSESSMENT & PLAN NOTE
Alcohol abuse  Pancytopenia  thrombocytopenia  Likely source of elevated LFTs  Trend CMP daily  Thiamine, level and replace

## 2020-11-11 NOTE — NURSING
Dr. Nguyen and Dr. Goff at bedside for placement of central line as femoral central line to be removed per protocol

## 2020-11-11 NOTE — NURSING
Unable to obtain temp via oral, axillary, or rectal routes. Esophageal core temp probe placed, temp noted to be 91. Ikn hugger and extra blankets placed. Dr. Paulino at bedside for pt eval and assessment, discussed patients history, current core temp with interventions and am lab results with current electrolyte replacement orders. Awaiting further orders. Patients daughter at bedside, aware of current critical state of patient, agrees with current DNR order, agrees with no intervention should patients heart stop.

## 2020-11-11 NOTE — ASSESSMENT & PLAN NOTE
Severe hyponatremia - sodium 117 on admission, variable with fluids  Check BMP q4h until >125  Lab work and history suggests dehydration  IV fluid hydration with NS appears to be improving   Admit to ICU for continuous cardiac monitoring

## 2020-11-11 NOTE — PROGRESS NOTES
[facility] - ICU Hospital Medicine  Progress Note    Patient Name: David Mcintosh  MRN: [id number]  Patient Class: IP- Inpatient   Admission Date: 11/9/2020  Length of Stay: 1 days  Attending Physician: Lolly Goff MD  Primary Care Provider: Janey Garcia MD        Subjective:     Principal Problem:Hyponatremia        HPI:  Patient is a 65-year-old male who presents to the ER by ambulance for questionable syncope and collapse with possible fall.  History obtained per ER notes.  Patient found lethargic at home, possibly at home.  Unknown preceding event.  Has a history of lung cancer, COPD, falls, peripheral vascular disease, tremors, venous stasis dermatitis of the lower extremities.  In the ER, CK elevated to 7000, CRP 5.27, , lactic acid 2.5, nitrite positive on urinalysis, UDS negative, LFTs elevated. Hospital team called for admission for altered mental status with possible syncope, COPD exacerbation, UTI.     Overview/Hospital Course:  No notes on file    Interval History: no acute events    Review of Systems   Unable to perform ROS: Intubated     Objective:     Vital Signs (Most Recent):  Temp: 97.6 °F (36.4 °C) (11/10/20 1600)  Pulse: 61 (11/10/20 1830)  Resp: 16 (11/10/20 1830)  BP: 117/78 (11/10/20 1830)  SpO2: 100 % (11/10/20 1830) Vital Signs (24h Range):  Temp:  [97.4 °F (36.3 °C)-97.6 °F (36.4 °C)] 97.6 °F (36.4 °C)  Pulse:  [61-89] 61  Resp:  [9-24] 16  SpO2:  [89 %-100 %] 100 %  BP: ()/(48-93) 117/78     Weight: 66.5 kg (146 lb 9.7 oz)  Body mass index is 19.34 kg/m².    Intake/Output Summary (Last 24 hours) at 11/10/2020 1915  Last data filed at 11/10/2020 1800  Gross per 24 hour   Intake 2416.49 ml   Output 750 ml   Net 1666.49 ml      Physical Exam  Vitals signs reviewed.   Constitutional:       Comments: Intubated, sedated, cachectic   HENT:      Head: Normocephalic and atraumatic.      Right Ear: External ear normal.      Left Ear: External ear  normal.      Nose: Nose normal.      Mouth/Throat:      Mouth: Mucous membranes are moist.   Eyes:      Conjunctiva/sclera: Conjunctivae normal.   Cardiovascular:      Rate and Rhythm: Normal rate and regular rhythm.      Pulses: Normal pulses.      Heart sounds: No murmur. No gallop.    Pulmonary:      Comments: Mechanically ventilated, fair to moderate air movement, no wheezes, no rhonchi  Abdominal:      General: Bowel sounds are normal. There is no distension.      Tenderness: There is no abdominal tenderness. There is no guarding.   Musculoskeletal:      Right lower leg: No edema.      Left lower leg: No edema.   Skin:     General: Skin is warm and dry.   Neurological:      Comments: Involuntary tremors   Psychiatric:         Mood and Affect: Mood normal.         Behavior: Behavior normal.         Significant Labs:   BMP:   Recent Labs   Lab 11/10/20  1608      *   K 3.6   CL 82*   CO2 27   BUN 7*   CREATININE 0.4*   CALCIUM 7.0*   MG 1.4*     CBC:   Recent Labs   Lab 11/09/20  1017 11/10/20  0215   WBC 3.56* 1.94*   HGB 11.7* 10.9*   HCT 33.0* 29.2*   PLT 50* 45*     CMP:   Recent Labs   Lab 11/09/20  1017  11/10/20  0702 11/10/20  1204 11/10/20  1608   *   < > 120* 117* 116*   K 2.9*   < > 3.4* 3.8 3.6   CL 72*   < > 84* 82* 82*   CO2 29   < > 29 27 27   *   < > 98 116* 110   BUN 10   < > 9 8 7*   CREATININE 0.5   < > <0.3* <0.3* 0.4*   CALCIUM 7.2*   < > 7.1* 7.1* 7.0*   PROT 5.3*  --   --   --   --    ALBUMIN 2.6*  --   --   --   --    BILITOT 1.7*  --   --   --   --    ALKPHOS 86  --   --   --   --    *  --   --   --   --    *  --   --   --   --    ANIONGAP 16   < > 7* 8 7*   EGFRNONAA >60.0   < > >60.0 >60.0 >60.0    < > = values in this interval not displayed.     All pertinent labs within the past 24 hours have been reviewed.    Significant Imaging: I have reviewed all pertinent imaging results/findings within the past 24 hours.      Assessment/Plan:      *  Hyponatremia  Severe hyponatremia - sodium 117 on admission, variable with fluids  Check BMP q4h until >125  Lab work and history suggests dehydration  IV fluid hydration with NS appears to be improving   Admit to ICU for continuous cardiac monitoring      Hypotension  Possibly 2/2 sedation vs sepsis vs dehydration  Aggressive IV fluid resuscitation as tolerated      Acute cystitis without hematuria  Started on Rocephin 1g IV q24h   Will continue on admission      Alcoholic cirrhosis  Likely source of elevated LFTs  Trend CMP daily        Traumatic rhabdomyolysis  Likely 2/2 muscle degradation after lying on the floor from fall  Trend CK, improving  IV fluid hydration aggressive as tolerated        Purpura  Significant on all extremities  Likely sequela of liver disease   Monitor platelets      Thrombocytopenia  Unknown baseline  2/2 liver cirrhosis/failure vs cancer or other  Trend daily  Transfuse for plts <10,000 or higher thresholds with bleeding         Hypokalemia  Severe at 2.9  Replace parenterally  Likely 2/2 malnutrition/dehydration      Syncope and collapse  Questionable occurrence       Chronic obstructive pulmonary disease  Continue scheduled duoneb treatments      Wound of right leg  Chronic, possible new source of infection causing syncope/collapse  Daily dressing changes  Culture any fluid expression        VTE Risk Mitigation (From admission, onward)         Ordered     Place RAULITO hose  Until discontinued      11/09/20 1821     Reason for No Pharmacological VTE Prophylaxis  Once     Question:  Reasons:  Answer:  Thrombocytopenia    11/09/20 1821     IP VTE HIGH RISK PATIENT  Once      11/09/20 1821     Place sequential compression device  Until discontinued      11/09/20 1821                Discharge Planning   MACKENZIE:      Code Status: DNR   Is the patient medically ready for discharge?:     Reason for patient still in hospital (select all that apply): Treatment                     Lolly Goff,  MD  Department of Hospital Medicine   [facility]

## 2020-11-11 NOTE — ASSESSMENT & PLAN NOTE
Likely 2/2 muscle degradation after lying on the floor from fall  Trend CK, improving  IV fluid hydration aggressive as tolerated

## 2020-11-11 NOTE — PROGRESS NOTES
[facility] - ICU Hospital Medicine  Progress Note    Patient Name: David Mcintosh  MRN: [id number]  Patient Class: IP- Inpatient   Admission Date: 11/9/2020  Length of Stay: 2 days  Attending Physician: Lolly Goff MD  Primary Care Provider: Janey Garcia MD        Subjective:     Principal Problem:Hyponatremia        HPI:  Patient is a 65-year-old male who presents to the ER by ambulance for questionable syncope and collapse with possible fall.  History obtained per ER notes.  Patient found lethargic at home, possibly at home.  Unknown preceding event.  Has a history of lung cancer, COPD, falls, peripheral vascular disease, tremors, venous stasis dermatitis of the lower extremities.  In the ER, CK elevated to 7000, CRP 5.27, , lactic acid 2.5, nitrite positive on urinalysis, UDS negative, LFTs elevated. Hospital team called for admission for altered mental status with possible syncope, COPD exacerbation, UTI.     Overview/Hospital Course:  No notes on file    Interval History:   Worsening Na level, third spacing with hypoalbunemia - IV albumin     Daughter by bedside reported patient takes alcohol daily- Thiamnine   Blood cx NGTD, urine cx with enterococcus continue Ceftriaxone  CPK- down trending     Review of Systems   Unable to perform ROS: Intubated     Objective:     Vital Signs (Most Recent):  Temp: 97.6 °F (36.4 °C) (11/10/20 1600)  Pulse: (!) 56 (11/11/20 1000)  Resp: 16 (11/11/20 1000)  BP: 94/67 (11/11/20 1000)  SpO2: 100 % (11/11/20 1000) Vital Signs (24h Range):  Temp:  [97.5 °F (36.4 °C)-97.6 °F (36.4 °C)] 97.6 °F (36.4 °C)  Pulse:  [48-76] 56  Resp:  [15-16] 16  SpO2:  [100 %] 100 %  BP: ()/() 94/67     Weight: 66.5 kg (146 lb 9.7 oz)  Body mass index is 19.34 kg/m².    Intake/Output Summary (Last 24 hours) at 11/11/2020 1027  Last data filed at 11/11/2020 0600  Gross per 24 hour   Intake 1586.49 ml   Output 1400 ml   Net 186.49 ml      Physical  Exam  Vitals signs reviewed.   Constitutional:       Comments: Intubated, sedated, cachectic   HENT:      Head: Normocephalic and atraumatic.   Cardiovascular:      Rate and Rhythm: Normal rate and regular rhythm.      Pulses: Normal pulses.      Heart sounds: No murmur. No gallop.    Pulmonary:      Comments: Mechanically ventilated, fair to moderate air movement, no wheezes, no rhonchi  Abdominal:      General: Bowel sounds are normal. There is no distension.      Tenderness: There is no abdominal tenderness. There is no guarding.   Musculoskeletal:      Right lower leg: No edema.      Left lower leg: No edema.   Skin:     General: Skin is warm and dry.         Significant Labs:   BMP:   Recent Labs   Lab 11/11/20  0753   *   *   K 4.0   CL 87*   CO2 24   BUN 6*   CREATININE <0.3*   CALCIUM 7.1*   MG 1.2*     CBC:   Recent Labs   Lab 11/10/20  0215 11/11/20  0434   WBC 1.94* 3.78*   HGB 10.9* 10.4*   HCT 29.2* 27.8*   PLT 45* 27*     CMP:   Recent Labs   Lab 11/10/20  0702  11/11/20  0013 11/11/20  0434 11/11/20  0753   *   < > 120* 120*  120* 118*   K 3.4*   < > 3.2* 4.0  4.0 4.0   CL 84*   < > 87* 90*  90* 87*   CO2 29   < > 26 22*  22* 24   GLU 98   < > 160* 170*  170* 185*   BUN 9   < > 6* 6*  6* 6*   CREATININE <0.3*   < > <0.3* <0.3*  <0.3* <0.3*   CALCIUM 7.1*   < > 7.2* 6.7*  6.7* 7.1*   PROT 4.0*  --   --  4.0*  --    ALBUMIN 2.0*  --   --  1.8*  --    BILITOT 1.2*  --   --  1.1*  --    ALKPHOS 62  --   --  66  --    *  --   --  175*  --    *  --   --  104*  --    ANIONGAP 7*   < > 7* 8  8 7*   EGFRNONAA >60.0   < > >60.0 >60.0  >60.0 >60.0    < > = values in this interval not displayed.     All pertinent labs within the past 24 hours have been reviewed.    Significant Imaging: I have reviewed all pertinent imaging results/findings within the past 24 hours.      Assessment/Plan:      * Hyponatremia  Severe hyponatremia - sodium 117 on admission, variable with  fluids  Check BMP q4h until >125  Lab work and history suggests dehydration  IV fluid hydration with NS appears to be improving   Admit to ICU for continuous cardiac monitoring      DNR (do not resuscitate)  Daughter by beside confirmed code status as DNR      Protein-calorie malnutrition, severe  Albumin 2 on admit  Replace       Pneumonia    Continue scheduled duoneb treatments  Blood culture NGTD  \continue Ceftriaxone      Hypotension  Possibly 2/2 sedation vs sepsis vs dehydration  Aggressive IV fluid resuscitation as tolerated      Acute cystitis without hematuria  UA positive  urine cx with enterococcus continue Ceftriaxone  Blood cx NGTD  continue on Rocephin 1g IV q24h   Will continue on admission      Alcoholic cirrhosis  Alcohol abuse  Pancytopenia  thrombocytopenia  Likely source of elevated LFTs  Trend CMP daily  Thiamine, level and replace      Traumatic rhabdomyolysis  Likely 2/2 muscle degradation after lying on the floor from fall  Trend CK, improving  IV fluid hydration aggressive as tolerated        Purpura  Significant on all extremities  Likely sequela of liver disease   Monitor platelets      Thrombocytopenia  Unknown baseline  2/2 liver cirrhosis/failure vs cancer or other  Trend daily  Transfuse for plts <10,000 or higher thresholds with bleeding         Hypokalemia  Hypomagnesemia   Severe at 2.9  Replace parenterally  Likely 2/2 malnutrition/dehydration      Syncope and collapse  Questionable occurrence       Chronic obstructive pulmonary disease  Continue scheduled duoneb treatments      Wound of right leg  Chronic, possible new source of infection causing syncope/collapse  Daily dressing changes  Culture any fluid expression        VTE Risk Mitigation (From admission, onward)         Ordered     Place RAULITO hose  Until discontinued      11/09/20 1821     Reason for No Pharmacological VTE Prophylaxis  Once     Question:  Reasons:  Answer:  Thrombocytopenia    11/09/20 1821     IP VTE HIGH  RISK PATIENT  Once      11/09/20 1821     Place sequential compression device  Until discontinued      11/09/20 1821                Discharge Planning   MACKENZIE:      Code Status: DNR   Is the patient medically ready for discharge?:     Reason for patient still in hospital (select all that apply): Patient trending condition                     Glenys Garcia MD  Department of Hospital Medicine   [facility] - ICU

## 2020-11-11 NOTE — NURSING
Dr hong on unit.   Labs reported to her.  Pt BP going up and down withminimal adjsutments to levophed.

## 2020-11-11 NOTE — SUBJECTIVE & OBJECTIVE
Interval History: no acute events    Review of Systems   Unable to perform ROS: Intubated     Objective:     Vital Signs (Most Recent):  Temp: 97.6 °F (36.4 °C) (11/10/20 1600)  Pulse: 61 (11/10/20 1830)  Resp: 16 (11/10/20 1830)  BP: 117/78 (11/10/20 1830)  SpO2: 100 % (11/10/20 1830) Vital Signs (24h Range):  Temp:  [97.4 °F (36.3 °C)-97.6 °F (36.4 °C)] 97.6 °F (36.4 °C)  Pulse:  [61-89] 61  Resp:  [9-24] 16  SpO2:  [89 %-100 %] 100 %  BP: ()/(48-93) 117/78     Weight: 66.5 kg (146 lb 9.7 oz)  Body mass index is 19.34 kg/m².    Intake/Output Summary (Last 24 hours) at 11/10/2020 1915  Last data filed at 11/10/2020 1800  Gross per 24 hour   Intake 2416.49 ml   Output 750 ml   Net 1666.49 ml      Physical Exam  Vitals signs reviewed.   Constitutional:       Comments: Intubated, sedated, cachectic   HENT:      Head: Normocephalic and atraumatic.      Right Ear: External ear normal.      Left Ear: External ear normal.      Nose: Nose normal.      Mouth/Throat:      Mouth: Mucous membranes are moist.   Eyes:      Conjunctiva/sclera: Conjunctivae normal.   Cardiovascular:      Rate and Rhythm: Normal rate and regular rhythm.      Pulses: Normal pulses.      Heart sounds: No murmur. No gallop.    Pulmonary:      Comments: Mechanically ventilated, fair to moderate air movement, no wheezes, no rhonchi  Abdominal:      General: Bowel sounds are normal. There is no distension.      Tenderness: There is no abdominal tenderness. There is no guarding.   Musculoskeletal:      Right lower leg: No edema.      Left lower leg: No edema.   Skin:     General: Skin is warm and dry.   Neurological:      Comments: Involuntary tremors   Psychiatric:         Mood and Affect: Mood normal.         Behavior: Behavior normal.         Significant Labs:   BMP:   Recent Labs   Lab 11/10/20  1608      *   K 3.6   CL 82*   CO2 27   BUN 7*   CREATININE 0.4*   CALCIUM 7.0*   MG 1.4*     CBC:   Recent Labs   Lab 11/09/20  1017  11/10/20  0215   WBC 3.56* 1.94*   HGB 11.7* 10.9*   HCT 33.0* 29.2*   PLT 50* 45*     CMP:   Recent Labs   Lab 11/09/20  1017  11/10/20  0702 11/10/20  1204 11/10/20  1608   *   < > 120* 117* 116*   K 2.9*   < > 3.4* 3.8 3.6   CL 72*   < > 84* 82* 82*   CO2 29   < > 29 27 27   *   < > 98 116* 110   BUN 10   < > 9 8 7*   CREATININE 0.5   < > <0.3* <0.3* 0.4*   CALCIUM 7.2*   < > 7.1* 7.1* 7.0*   PROT 5.3*  --   --   --   --    ALBUMIN 2.6*  --   --   --   --    BILITOT 1.7*  --   --   --   --    ALKPHOS 86  --   --   --   --    *  --   --   --   --    *  --   --   --   --    ANIONGAP 16   < > 7* 8 7*   EGFRNONAA >60.0   < > >60.0 >60.0 >60.0    < > = values in this interval not displayed.     All pertinent labs within the past 24 hours have been reviewed.    Significant Imaging: I have reviewed all pertinent imaging results/findings within the past 24 hours.

## 2020-11-11 NOTE — ASSESSMENT & PLAN NOTE
UA positive  urine cx with enterococcus continue Ceftriaxone  Blood cx NGTD  continue on Rocephin 1g IV q24h   Will continue on admission

## 2020-11-12 NOTE — PLAN OF CARE
Wounds redressed.   Pt with a lot of 3rd spacing and weeping from extremeties.  Urine output good. Lightly sedated on mechanical ventilation.  Chlorahexadine complete bath, linen change and waite care with gown change  sr on tele tonight

## 2020-11-12 NOTE — NURSING
Spoke with pt's daughter, Chele, at bedside. She states she is interested in hospice care, as she will need help taking care of patient at home if he pulls through. She is not interested in placement at this time, but does want to give him a few days on current treatment to see if he improves before making any decisions regarding withdrawal of care.     Chele would like to speak to  and attending physician when possible to discuss plan of care. Her number is [phone]. She works from [employer].

## 2020-11-13 NOTE — NURSING
Received bedside report from SANA Zaragoza. Pt resting comfortably in bed. RTL IJ with multiple drips infusing. Ramirez cath draining clear nasreen urine. Esophageal temperature monitoring in place. Pt awakens briefly to voice/gentle shaking.     Drips:   Levophed 0.1mcg/kg/min  Versed 3mg/hr  NS 125mL/hr  Precedex 0.04mcg/kg/hr    Vent:    8 ETT 24 @ teeth  AC: 18  FiO2: 35%  PEEP 5  Vt 500

## 2020-11-13 NOTE — ASSESSMENT & PLAN NOTE
Hypomagnesemia   Magnesium 1.3, replaced with 4 g  Hypokalemia resolved but will monitor and replace as needed parenterally  Likely 2/2 malnutrition/dehydration

## 2020-11-13 NOTE — PROGRESS NOTES
[facility] - ICU Hospital Medicine  Progress Note    Patient Name: David Mcintosh  MRN: [id number]  Patient Class: IP- Inpatient   Admission Date: 11/9/2020  Length of Stay: 4 days  Attending Physician: Lolly Goff MD  Primary Care Provider: Janey Garcia MD        Subjective:     Principal Problem:Hyponatremia        HPI:  Patient is a 65-year-old male who presents to the ER by ambulance for questionable syncope and collapse with possible fall.  History obtained per ER notes.  Patient found lethargic at home, possibly at home.  Unknown preceding event.  Has a history of lung cancer, COPD, falls, peripheral vascular disease, tremors, venous stasis dermatitis of the lower extremities.  In the ER, CK elevated to 7000, CRP 5.27, , lactic acid 2.5, nitrite positive on urinalysis, UDS negative, LFTs elevated. Hospital team called for admission for altered mental status with possible syncope, COPD exacerbation, UTI.     Overview/Hospital Course:  No notes on file    Interval History:  This is a late entry for 11/12/2020.    Patient continues to be intubated and sedated and is not responsive to verbal or painful stimuli.  Nursing staff reported that he did have some meaningful movement to loud verbal stimuli early in the morning.  Sodium level continues to be quite low and ABGs continued to show respiratory acidosis, will repeat ABGs in the afternoon.  Otherwise no acute events overnight.    Review of Systems   Unable to perform ROS: Intubated     Objective:     Vital Signs (Most Recent):  Temp: 97.6 °F (36.4 °C) (11/10/20 1600)  Pulse: 79 (11/13/20 0630)  Resp: 17 (11/13/20 0630)  BP: (!) 73/52 (11/13/20 0630)  SpO2: 99 % (11/13/20 0630) Vital Signs (24h Range):  Pulse:  [68-99] 79  Resp:  [17-19] 17  SpO2:  [95 %-100 %] 99 %  BP: ()/(51-96) 73/52     Weight: 66.5 kg (146 lb 9.7 oz)  Body mass index is 19.34 kg/m².    Intake/Output Summary (Last 24 hours) at 11/13/2020  0718  Last data filed at 11/13/2020 0200  Gross per 24 hour   Intake 3117.31 ml   Output 2565 ml   Net 552.31 ml      Physical Exam  Vitals signs reviewed.   HENT:      Mouth/Throat:      Mouth: Mucous membranes are moist.   Cardiovascular:      Rate and Rhythm: Normal rate and regular rhythm.      Heart sounds: No murmur.   Pulmonary:      Breath sounds: No wheezing.   Abdominal:      General: There is no distension.      Palpations: Abdomen is soft.   Skin:     General: Skin is warm.         Significant Labs:   ABGs:   Recent Labs   Lab 11/12/20  1622   PH 7.415   PCO2 43.3   HCO3 27.8   POCSATURATED 99   BE 3     BMP:   Recent Labs   Lab 11/12/20  0557  11/13/20 0413   *   < > 118*  118*   *   < > 128*  128*   K 3.9   < > 3.0*  3.0*   CL 91*   < > 94*  94*   CO2 26   < > 26  26   BUN 5*   < > <5*  <5*   CREATININE 0.5   < > <0.3*  <0.3*   CALCIUM 7.1*   < > 7.4*  7.4*   MG 1.3*  --   --     < > = values in this interval not displayed.     CBC:   Recent Labs   Lab 11/12/20  0559 11/13/20  0535   WBC 2.84* 3.41*   HGB 8.1* 6.0*   HCT 22.5* 16.6*   PLT 44* 30*     CMP:   Recent Labs   Lab 11/12/20  0557  11/12/20 2009 11/13/20 0009 11/13/20 0413   *   < > 125* 126* 128*  128*   K 3.9   < > 3.6 3.5 3.0*  3.0*   CL 91*   < > 91* 92* 94*  94*   CO2 26   < > 27 27 26  26   *   < > 134* 122* 118*  118*   BUN 5*   < > <5* <5* <5*  <5*   CREATININE 0.5   < > 0.4* 0.3* <0.3*  <0.3*   CALCIUM 7.1*   < > 7.4* 7.4* 7.4*  7.4*   PROT 4.3*  --   --   --  4.2*   ALBUMIN 2.4*  --   --   --  2.5*   BILITOT 1.0  --   --   --  1.1*   ALKPHOS 55  --   --   --  49*   AST 92*  --   --   --  67*   ALT 73*  --   --   --  56*   ANIONGAP 4*   < > 7* 7* 8  8   EGFRNONAA >60.0   < > >60.0 >60.0 >60.0  >60.0    < > = values in this interval not displayed.     Magnesium:   Recent Labs   Lab 11/11/20  0753 11/12/20  0557   MG 1.2* 1.3*     All pertinent labs within the past 24 hours have been  reviewed.    Significant Imaging: I have reviewed and interpreted all pertinent imaging results/findings within the past 24 hours.      Assessment/Plan:      * Hyponatremia  Severe hyponatremia - sodium 117 on admission, variable with fluids - today 121  Check BMP q4h until >125  Lab work and history suggests dehydration  IV fluid hydration with NS appears to be improving   Continue continuous cardiac monitoring      DNR (do not resuscitate)  Daughter by beside confirmed code status as DNR      Protein-calorie malnutrition, severe  Albumin 2 on admit  Replace       Pneumonia    Continue scheduled duoneb treatments  Blood culture NGTD  Continue Ceftriaxone      Hypotension  Possibly 2/2 sedation vs sepsis vs dehydration  Aggressive IV fluid resuscitation as tolerated      Acute cystitis without hematuria  UA positive  urine cx with enterococcus continue Ceftriaxone  Blood cx NGTD  continue on Rocephin 1g IV q24h   Will continue on admission      Alcoholic cirrhosis  Alcohol abuse  Pancytopenia  thrombocytopenia  Likely source of elevated LFTs  Trend CMP daily  Thiamine, level and replace      Traumatic rhabdomyolysis  Likely 2/2 muscle degradation after lying on the floor from fall  Trend CK, improving  IV fluid hydration aggressive as tolerated        Purpura  Significant on all extremities  Likely sequela of liver disease   Platelets 44 today      Thrombocytopenia  Unknown baseline  2/2 liver cirrhosis/failure vs cancer or other  Platelets 44 today  Transfuse for plts <10,000 or higher thresholds with bleeding         Hypokalemia  Hypomagnesemia   Magnesium 1.3, replaced with 4 g  Hypokalemia resolved but will monitor and replace as needed parenterally  Likely 2/2 malnutrition/dehydration      Syncope and collapse  Questionable occurrence       Chronic obstructive pulmonary disease  Continue scheduled duoneb treatments      Wound of right leg  Chronic, possible new source of infection causing  syncope/collapse  Daily dressing changes  Culture any fluid expression        VTE Risk Mitigation (From admission, onward)         Ordered     Place RAULITO hose  Until discontinued      11/09/20 1821     Reason for No Pharmacological VTE Prophylaxis  Once     Question:  Reasons:  Answer:  Thrombocytopenia    11/09/20 1821     IP VTE HIGH RISK PATIENT  Once      11/09/20 1821     Place sequential compression device  Until discontinued      11/09/20 1821                Discharge Planning   MACKENZIE:      Code Status: DNR   Is the patient medically ready for discharge?:     Reason for patient still in hospital (select all that apply): Patient trending condition                     Sean Epstein DO  Department of Hospital Medicine   [facility]

## 2020-11-13 NOTE — ASSESSMENT & PLAN NOTE
Unknown baseline  2/2 liver cirrhosis/failure vs cancer or other  Platelets 44 today  Transfuse for plts <10,000 or higher thresholds with bleeding

## 2020-11-13 NOTE — NURSING
Spoke with MELINA Oleary MD regarding pt's AM labs.   H/h 6/16  plt 30  k 3.0    Orders to implement ICU electrolyte replacement and transfuse 1 unit PRBCs.

## 2020-11-13 NOTE — SUBJECTIVE & OBJECTIVE
Interval History:  This is a late entry for 11/12/2020.    Patient continues to be intubated and sedated and is not responsive to verbal or painful stimuli.  Nursing staff reported that he did have some meaningful movement to loud verbal stimuli early in the morning.  Sodium level continues to be quite low and ABGs continued to show respiratory acidosis, will repeat ABGs in the afternoon.  Otherwise no acute events overnight.    Review of Systems   Unable to perform ROS: Intubated     Objective:     Vital Signs (Most Recent):  Temp: 97.6 °F (36.4 °C) (11/10/20 1600)  Pulse: 79 (11/13/20 0630)  Resp: 17 (11/13/20 0630)  BP: (!) 73/52 (11/13/20 0630)  SpO2: 99 % (11/13/20 0630) Vital Signs (24h Range):  Pulse:  [68-99] 79  Resp:  [17-19] 17  SpO2:  [95 %-100 %] 99 %  BP: ()/(51-96) 73/52     Weight: 66.5 kg (146 lb 9.7 oz)  Body mass index is 19.34 kg/m².    Intake/Output Summary (Last 24 hours) at 11/13/2020 0718  Last data filed at 11/13/2020 0200  Gross per 24 hour   Intake 3117.31 ml   Output 2565 ml   Net 552.31 ml      Physical Exam  Vitals signs reviewed.   HENT:      Mouth/Throat:      Mouth: Mucous membranes are moist.   Cardiovascular:      Rate and Rhythm: Normal rate and regular rhythm.      Heart sounds: No murmur.   Pulmonary:      Breath sounds: No wheezing.   Abdominal:      General: There is no distension.      Palpations: Abdomen is soft.   Skin:     General: Skin is warm.         Significant Labs:   ABGs:   Recent Labs   Lab 11/12/20  1622   PH 7.415   PCO2 43.3   HCO3 27.8   POCSATURATED 99   BE 3     BMP:   Recent Labs   Lab 11/12/20  0557  11/13/20  0413   *   < > 118*  118*   *   < > 128*  128*   K 3.9   < > 3.0*  3.0*   CL 91*   < > 94*  94*   CO2 26   < > 26  26   BUN 5*   < > <5*  <5*   CREATININE 0.5   < > <0.3*  <0.3*   CALCIUM 7.1*   < > 7.4*  7.4*   MG 1.3*  --   --     < > = values in this interval not displayed.     CBC:   Recent Labs   Lab 11/12/20  0520  11/13/20  0535   WBC 2.84* 3.41*   HGB 8.1* 6.0*   HCT 22.5* 16.6*   PLT 44* 30*     CMP:   Recent Labs   Lab 11/12/20  0557  11/12/20 2009 11/13/20  0009 11/13/20  0413   *   < > 125* 126* 128*  128*   K 3.9   < > 3.6 3.5 3.0*  3.0*   CL 91*   < > 91* 92* 94*  94*   CO2 26   < > 27 27 26  26   *   < > 134* 122* 118*  118*   BUN 5*   < > <5* <5* <5*  <5*   CREATININE 0.5   < > 0.4* 0.3* <0.3*  <0.3*   CALCIUM 7.1*   < > 7.4* 7.4* 7.4*  7.4*   PROT 4.3*  --   --   --  4.2*   ALBUMIN 2.4*  --   --   --  2.5*   BILITOT 1.0  --   --   --  1.1*   ALKPHOS 55  --   --   --  49*   AST 92*  --   --   --  67*   ALT 73*  --   --   --  56*   ANIONGAP 4*   < > 7* 7* 8  8   EGFRNONAA >60.0   < > >60.0 >60.0 >60.0  >60.0    < > = values in this interval not displayed.     Magnesium:   Recent Labs   Lab 11/11/20  0753 11/12/20 0557   MG 1.2* 1.3*     All pertinent labs within the past 24 hours have been reviewed.    Significant Imaging: I have reviewed and interpreted all pertinent imaging results/findings within the past 24 hours.

## 2020-11-13 NOTE — PLAN OF CARE
11/13/20 1528   Discharge Assessment   Assessment Type Discharge Planning Assessment   Confirmed/corrected address and phone number on facesheet? Yes   Assessment information obtained from? Other  (Pts daugther provided information for this assessment.)   Expected Length of Stay (days) 10   Communicated expected length of stay with patient/caregiver yes   Prior to hospitilization cognitive status: Alert/Oriented   Prior to hospitalization functional status: Needs Assistance   Current cognitive status: Coma/Sedated/Intubated   Current Functional Status: Completely Dependent   Facility Arrived From: Home   Lives With child(morgan), adult   Able to Return to Prior Arrangements   (TBD)   Who are your caregiver(s) and their phone number(s)? Chele Lan daughter [phone]   Patient's perception of discharge disposition   (TBD)   Readmission Within the Last 30 Days unable to assess   Patient currently being followed by outpatient case management? No   Patient currently receives any other outside agency services? No   Do you have any problems affording any of your prescribed medications? No   Is the patient taking medications as prescribed? yes   Does the patient have transportation home?   (TBD)   Dialysis Name and Scheduled days n/a   Does the patient receive services at the [facility]? No   Discharge Plan A Other   DME Needed Upon Discharge  other (see comments)  (TBD)   Patient/Family in Agreement with Plan other (see comments)  (TBD)     Pts daughter arrived in the ICU to meet with this SW prior to reporting to work this day. Daughter provided information on the pts prior level of functioning and long history of alcoholism. She previously indicated to staff if the pt has no signs of recovery by Monday, she would consider hospice and stop any treatment. During meeting with the daughter to pt appeared to respond to her touch and voice when she spoke to him. Pts daughter became increasingly emotional to these  responses and requested conversation continue on Monday. She verbalized the desire to see if the pt is recovering and consider what she wants to do over the weekend. Daughter informed this SW she is off work on Monday & Tuesday next week and she will be available to be here for discussions and to make decisions as appropriate.

## 2020-11-13 NOTE — PLAN OF CARE
Problem: Device-Related Complication Risk (Mechanical Ventilation, Invasive)  Goal: Optimal Device Function  Outcome: Ongoing, Progressing  Intervention: Optimize Device Care and Function  Flowsheets (Taken 11/13/2020 0019)  Aspiration Precautions: respiratory status monitored  Airway Safety Measures: mask at bedside     Problem: Inability to Wean (Mechanical Ventilation, Invasive)  Goal: Mechanical Ventilation Liberation  Outcome: Ongoing, Progressing  Intervention: Promote Extubation and Mechanical Ventilation Liberation  Flowsheets (Taken 11/13/2020 0019)  Environmental Support:   calm environment promoted   environmental consistency promoted  Medication Review/Management:   dosing adjusted   infusion titrated     Problem: Ventilator-Induced Lung Injury (Mechanical Ventilation, Invasive)  Goal: Absence of Ventilator-Induced Lung Injury  Outcome: Ongoing, Progressing  Intervention: Prevent Ventilator-Associated Pneumonia  Flowsheets (Taken 11/13/2020 0019)  Head of Bed (HOB): HOB at 30-45 degrees  Oral Care: swabbed with antiseptic solution

## 2020-11-13 NOTE — ASSESSMENT & PLAN NOTE
Severe hyponatremia - sodium 117 on admission, variable with fluids - today 121  Check BMP q4h until >125  Lab work and history suggests dehydration  IV fluid hydration with NS appears to be improving   Continue continuous cardiac monitoring

## 2020-11-14 NOTE — PLAN OF CARE
Problem: Inability to Wean (Mechanical Ventilation, Invasive)  Goal: Mechanical Ventilation Liberation  Outcome: Ongoing, Progressing  Intervention: Promote Extubation and Mechanical Ventilation Liberation  Flowsheets (Taken 11/14/2020 0210)  Sleep/Rest Enhancement: awakenings minimized  Environmental Support:   calm environment promoted   distractions minimized  Medication Review/Management: infusion titrated     Problem: Skin and Tissue Injury (Mechanical Ventilation, Invasive)  Goal: Absence of Device-Related Skin and Tissue Injury  Outcome: Ongoing, Progressing  Intervention: Maintain Skin and Tissue Health  Flowsheets (Taken 11/14/2020 0210)  Device Skin Pressure Protection: pressure points protected     Problem: Ventilator-Induced Lung Injury (Mechanical Ventilation, Invasive)  Goal: Absence of Ventilator-Induced Lung Injury  Outcome: Ongoing, Progressing  Intervention: Prevent Ventilator-Associated Pneumonia  Flowsheets (Taken 11/14/2020 0210)  Head of Bed (HOB): HOB at 30-45 degrees     Problem: Fall Injury Risk  Goal: Absence of Fall and Fall-Related Injury  Outcome: Ongoing, Progressing  Intervention: Promote Injury-Free Environment  Flowsheets (Taken 11/14/2020 0210)  Safety Promotion/Fall Prevention:   room near unit station   pulse ox   side rails raised x 2

## 2020-11-14 NOTE — ASSESSMENT & PLAN NOTE
Resolved, Levophed has been stopped  Possibly 2/2 sedation vs sepsis vs dehydration  Aggressive IV fluid resuscitation  when needed

## 2020-11-14 NOTE — PROGRESS NOTES
[facility] - ICU Hospital Medicine  Progress Note    Patient Name: David Mcintosh  MRN: [id number]  Patient Class: IP- Inpatient   Admission Date: 11/9/2020  Length of Stay: 5 days  Attending Physician: Lolly Goff MD  Primary Care Provider: Janey Garcia MD        Subjective:     Principal Problem:Hyponatremia        HPI:  Patient is a 65-year-old male who presents to the ER by ambulance for questionable syncope and collapse with possible fall.  History obtained per ER notes.  Patient found lethargic at home, possibly at home.  Unknown preceding event.  Has a history of lung cancer, COPD, falls, peripheral vascular disease, tremors, venous stasis dermatitis of the lower extremities.  In the ER, CK elevated to 7000, CRP 5.27, , lactic acid 2.5, nitrite positive on urinalysis, UDS negative, LFTs elevated. Hospital team called for admission for altered mental status with possible syncope, COPD exacerbation, UTI.     Overview/Hospital Course:  No notes on file    Interval History: Patient is still intubated and sedated and is not responsive to verbal or painful stimuli. Sodium level is still quite low at 130 and patient required 2 units PRBCs yesterday H&H 6/16.  Levophed has been stopped patient's blood pressure is stable at this time.  Patient also received albumin overnight.           Review of Systems   Unable to perform ROS: Intubated       Objective:     Vital Signs (Most Recent):  Temp: (!) 100.7 °F (38.2 °C) (11/13/20 1407)  Pulse: 80 (11/14/20 1600)  Resp: 18 (11/14/20 1600)  BP: (!) 152/100 (11/14/20 1600)  SpO2: 100 % (11/14/20 1600) Vital Signs (24h Range):  Pulse:  [69-87] 80  Resp:  [17-18] 18  SpO2:  [96 %-100 %] 100 %  BP: ()/() 152/100     Weight: 66.5 kg (146 lb 9.7 oz)  Body mass index is 19.34 kg/m².    Intake/Output Summary (Last 24 hours) at 11/14/2020 1700  Last data filed at 11/14/2020 1600  Gross per 24 hour   Intake 3778.82 ml   Output 2925  ml   Net 853.82 ml      Physical Exam     Physical Exam   Vitals signs reviewed.   HENT:      Mouth/Throat:      Mouth: Mucous membranes are moist.   Cardiovascular:      Rate and Rhythm: Normal rate and regular rhythm.      Heart sounds: No murmur.   Pulmonary:      Breath sounds: No wheezing.   Abdominal:      General: There is no distension.      Palpations: Abdomen is soft.   Skin:     General: Skin is warm. Legs wrapped. (Skin damage/bruisiing on legs- see pictures in chart.)       Significant Labs:   Blood Culture: No results for input(s): LABBLOO in the last 48 hours.  BMP:   Recent Labs   Lab 11/13/20 0413  11/14/20  0615   *  118*   < > 122*   *  128*   < > 130*   K 3.0*  3.0*   < > 3.5   CL 94*  94*   < > 97   CO2 26  26   < > 25   BUN <5*  <5*   < > 5*   CREATININE <0.3*  <0.3*   < > 0.3*   CALCIUM 7.4*  7.4*   < > 7.8*   MG 1.6  --   --     < > = values in this interval not displayed.     CBC:   Recent Labs   Lab 11/13/20  0535 11/13/20 1840 11/14/20  0615   WBC 3.41* 4.34 3.97   HGB 6.0* 8.2* 8.6*   HCT 16.6* 23.5* 24.3*   PLT 30* 32* 35*     CMP:   Recent Labs   Lab 11/13/20  0413 11/13/20 1840 11/14/20  0615   *  128* 129* 130*   K 3.0*  3.0* 3.4* 3.5   CL 94*  94* 96 97   CO2 26  26 25 25   *  118* 112* 122*   BUN <5*  <5* 5* 5*   CREATININE <0.3*  <0.3* 0.3* 0.3*   CALCIUM 7.4*  7.4* 7.7* 7.8*   PROT 4.2*  --  4.7*   ALBUMIN 2.5*  --  2.9*   BILITOT 1.1*  --  1.0   ALKPHOS 49*  --  56   AST 67*  --  52*   ALT 56*  --  46*   ANIONGAP 8  8 8 8   EGFRNONAA >60.0  >60.0 >60.0 >60.0     Lactic Acid: No results for input(s): LACTATE in the last 48 hours.  Lipid Panel: No results for input(s): CHOL, HDL, LDLCALC, TRIG, CHOLHDL in the last 48 hours.  Magnesium:   Recent Labs   Lab 11/13/20  0413   MG 1.6     Prealbumin: No results for input(s): PREALBUMIN in the last 48 hours.  Respiratory Culture: No results for input(s): GSRESP, RESPIRATORYC in the last 48  hours.  Urine Culture: No results for input(s): LABURIN in the last 48 hours.  Urine Studies: No results for input(s): COLORU, APPEARANCEUA, PHUR, SPECGRAV, PROTEINUA, GLUCUA, KETONESU, BILIRUBINUA, OCCULTUA, NITRITE, UROBILINOGEN, LEUKOCYTESUR, RBCUA, WBCUA, BACTERIA, SQUAMEPITHEL, HYALINECASTS in the last 48 hours.    Invalid input(s): WRIGHTSUR  All pertinent labs within the past 24 hours have been reviewed.    Significant Imaging: I have reviewed all pertinent imaging results/findings within the past 24 hours.      Assessment/Plan:      * Hyponatremia  Severe hyponatremia - sodium 117 on admission, variable with fluids - today 130  Check BMP q4h until >125  Lab work and history suggests dehydration  IV fluid hydration with NS appears to be improving   Continue continuous cardiac monitoring      DNR (do not resuscitate)  Daughter by beside confirmed code status as DNR      Protein-calorie malnutrition, severe  Albumin 2 on admit  Replace       Pancytopenia  Monitor and transfuse if needed      Pneumonia    Continue scheduled duoneb treatments  Blood culture NGTD  Continue Ceftriaxone      Hypotension  Resolved, Levophed has been stopped  Possibly 2/2 sedation vs sepsis vs dehydration  Aggressive IV fluid resuscitation  when needed      Acute cystitis without hematuria  UA positive  urine cx with enterococcus continue Ceftriaxone  Blood cx NGTD  continue on Rocephin 1g IV q24h   Will continue on admission      Alcoholic cirrhosis  Alcohol abuse  Pancytopenia  thrombocytopenia  Likely source of elevated LFTs  Trend CMP daily  Thiamine, level and replace      Traumatic rhabdomyolysis  Likely 2/2 muscle degradation after lying on the floor from fall  Trend CK, improving  IV fluid hydration aggressive as tolerated        Purpura  Significant on all extremities  Likely sequela of liver disease   Platelets 35 today      Thrombocytopenia  Unknown baseline  2/2 liver cirrhosis/failure vs cancer or other  Patient received  2 units PRBCs overnight  Transfuse for plts <10,000 or higher thresholds with bleeding         Hypokalemia  Hypomagnesemia   Monitor and replace as needed  Hypokalemia resolved but will monitor and replace as needed parenterally  Likely 2/2 malnutrition/dehydration      Syncope and collapse  Questionable occurrence       Chronic obstructive pulmonary disease  Continue scheduled duoneb treatments      Wound of right leg  Chronic, possible new source of infection causing syncope/collapse  Daily dressing changes  Culture any fluid expression        VTE Risk Mitigation (From admission, onward)         Ordered     Place RAULITO hose  Until discontinued      11/09/20 1821     Reason for No Pharmacological VTE Prophylaxis  Once     Question:  Reasons:  Answer:  Thrombocytopenia    11/09/20 1821     IP VTE HIGH RISK PATIENT  Once      11/09/20 1821     Place sequential compression device  Until discontinued      11/09/20 1821                Discharge Planning   MACKENZIE:      Code Status: DNR   Is the patient medically ready for discharge?:     Reason for patient still in hospital (select all that apply): Patient trending condition  Discharge Plan A: Other                  Sean Epstein DO  Department of Hospital Medicine   [facility]

## 2020-11-14 NOTE — SUBJECTIVE & OBJECTIVE
Interval History: Patient is still intubated and sedated and is not responsive to verbal or painful stimuli. Sodium level is still quite low at 130 and patient required 2 units PRBCs yesterday H&H 6/16.  Levophed has been stopped patient's blood pressure is stable at this time.  Patient also received albumin overnight.           Review of Systems   Unable to perform ROS: Intubated       Objective:     Vital Signs (Most Recent):  Temp: (!) 100.7 °F (38.2 °C) (11/13/20 1407)  Pulse: 80 (11/14/20 1600)  Resp: 18 (11/14/20 1600)  BP: (!) 152/100 (11/14/20 1600)  SpO2: 100 % (11/14/20 1600) Vital Signs (24h Range):  Pulse:  [69-87] 80  Resp:  [17-18] 18  SpO2:  [96 %-100 %] 100 %  BP: ()/() 152/100     Weight: 66.5 kg (146 lb 9.7 oz)  Body mass index is 19.34 kg/m².    Intake/Output Summary (Last 24 hours) at 11/14/2020 1700  Last data filed at 11/14/2020 1600  Gross per 24 hour   Intake 3778.82 ml   Output 2925 ml   Net 853.82 ml      Physical Exam     Physical Exam   Vitals signs reviewed.   HENT:      Mouth/Throat:      Mouth: Mucous membranes are moist.   Cardiovascular:      Rate and Rhythm: Normal rate and regular rhythm.      Heart sounds: No murmur.   Pulmonary:      Breath sounds: No wheezing.   Abdominal:      General: There is no distension.      Palpations: Abdomen is soft.   Skin:     General: Skin is warm. Legs wrapped. (Skin damage/bruisiing on legs- see pictures in chart.)       Significant Labs:   Blood Culture: No results for input(s): LABBLOO in the last 48 hours.  BMP:   Recent Labs   Lab 11/13/20  0413  11/14/20  0615   *  118*   < > 122*   *  128*   < > 130*   K 3.0*  3.0*   < > 3.5   CL 94*  94*   < > 97   CO2 26  26   < > 25   BUN <5*  <5*   < > 5*   CREATININE <0.3*  <0.3*   < > 0.3*   CALCIUM 7.4*  7.4*   < > 7.8*   MG 1.6  --   --     < > = values in this interval not displayed.     CBC:   Recent Labs   Lab 11/13/20  0535 11/13/20  1840 11/14/20  0615   WBC  3.41* 4.34 3.97   HGB 6.0* 8.2* 8.6*   HCT 16.6* 23.5* 24.3*   PLT 30* 32* 35*     CMP:   Recent Labs   Lab 11/13/20  0413 11/13/20  1840 11/14/20  0615   *  128* 129* 130*   K 3.0*  3.0* 3.4* 3.5   CL 94*  94* 96 97   CO2 26  26 25 25   *  118* 112* 122*   BUN <5*  <5* 5* 5*   CREATININE <0.3*  <0.3* 0.3* 0.3*   CALCIUM 7.4*  7.4* 7.7* 7.8*   PROT 4.2*  --  4.7*   ALBUMIN 2.5*  --  2.9*   BILITOT 1.1*  --  1.0   ALKPHOS 49*  --  56   AST 67*  --  52*   ALT 56*  --  46*   ANIONGAP 8  8 8 8   EGFRNONAA >60.0  >60.0 >60.0 >60.0     Lactic Acid: No results for input(s): LACTATE in the last 48 hours.  Lipid Panel: No results for input(s): CHOL, HDL, LDLCALC, TRIG, CHOLHDL in the last 48 hours.  Magnesium:   Recent Labs   Lab 11/13/20  0413   MG 1.6     Prealbumin: No results for input(s): PREALBUMIN in the last 48 hours.  Respiratory Culture: No results for input(s): GSRESP, RESPIRATORYC in the last 48 hours.  Urine Culture: No results for input(s): LABURIN in the last 48 hours.  Urine Studies: No results for input(s): COLORU, APPEARANCEUA, PHUR, SPECGRAV, PROTEINUA, GLUCUA, KETONESU, BILIRUBINUA, OCCULTUA, NITRITE, UROBILINOGEN, LEUKOCYTESUR, RBCUA, WBCUA, BACTERIA, SQUAMEPITHEL, HYALINECASTS in the last 48 hours.    Invalid input(s): WRIGHTSUR  All pertinent labs within the past 24 hours have been reviewed.    Significant Imaging: I have reviewed all pertinent imaging results/findings within the past 24 hours.

## 2020-11-14 NOTE — ASSESSMENT & PLAN NOTE
Hypomagnesemia   Monitor and replace as needed  Hypokalemia resolved but will monitor and replace as needed parenterally  Likely 2/2 malnutrition/dehydration

## 2020-11-14 NOTE — PROGRESS NOTES
[facility] - ICU Hospital Medicine  Progress Note    Patient Name: David Mcintosh  MRN: [id number]  Patient Class: IP- Inpatient   Admission Date: 11/9/2020  Length of Stay: 5 days  Attending Physician: Lolly Goff MD  Primary Care Provider: Janey Garcia MD        Subjective:     Principal Problem:Hyponatremia        HPI:  Patient is a 65-year-old male who presents to the ER by ambulance for questionable syncope and collapse with possible fall.  History obtained per ER notes.  Patient found lethargic at home, possibly at home.  Unknown preceding event.  Has a history of lung cancer, COPD, falls, peripheral vascular disease, tremors, venous stasis dermatitis of the lower extremities.  In the ER, CK elevated to 7000, CRP 5.27, , lactic acid 2.5, nitrite positive on urinalysis, UDS negative, LFTs elevated. Hospital team called for admission for altered mental status with possible syncope, COPD exacerbation, UTI.     Overview/Hospital Course:  No notes on file    Interval History: Patient continues to be intubated and sedated and is not responsive to verbal or painful stimuli.   Sodium level continues to be quite low and patient required 2 units PRBCs overnight due to H&H 6/16.  Levophed has been stopped patient's blood pressure is stable at this time.        Review of Systems   Unable to perform ROS: Intubated       Objective:     Vital Signs (Most Recent):  Temp: (!) 100.7 °F (38.2 °C) (11/13/20 1407)  Pulse: 74 (11/14/20 0741)  Resp: 18 (11/14/20 0741)  BP: (!) 141/91 (11/14/20 0500)  SpO2: 100 % (11/14/20 0741) Vital Signs (24h Range):  Temp:  [98.4 °F (36.9 °C)-100.7 °F (38.2 °C)] 100.7 °F (38.2 °C)  Pulse:  [72-90] 74  Resp:  [17-18] 18  SpO2:  [96 %-100 %] 100 %  BP: ()/(55-97) 141/91     Weight: 66.5 kg (146 lb 9.7 oz)  Body mass index is 19.34 kg/m².    Intake/Output Summary (Last 24 hours) at 11/14/2020 0807  Last data filed at 11/14/2020 0614  Gross per 24 hour    Intake 4232.57 ml   Output 2425 ml   Net 1807.57 ml      Physical Exam   Vitals signs reviewed.   HENT:      Mouth/Throat:      Mouth: Mucous membranes are moist.   Cardiovascular:      Rate and Rhythm: Normal rate and regular rhythm.      Heart sounds: No murmur.   Pulmonary:      Breath sounds: No wheezing.   Abdominal:      General: There is no distension.      Palpations: Abdomen is soft.   Skin:     General: Skin is warm.      Significant Labs:   CBC:   Recent Labs   Lab 11/13/20  0535 11/13/20 1840 11/14/20  0615   WBC 3.41* 4.34 3.97   HGB 6.0* 8.2* 8.6*   HCT 16.6* 23.5* 24.3*   PLT 30* 32* 35*     CMP:   Recent Labs   Lab 11/13/20  0413 11/13/20 1840 11/14/20  0615   *  128* 129* 130*   K 3.0*  3.0* 3.4* 3.5   CL 94*  94* 96 97   CO2 26  26 25 25   *  118* 112* 122*   BUN <5*  <5* 5* 5*   CREATININE <0.3*  <0.3* 0.3* 0.3*   CALCIUM 7.4*  7.4* 7.7* 7.8*   PROT 4.2*  --  4.7*   ALBUMIN 2.5*  --  2.9*   BILITOT 1.1*  --  1.0   ALKPHOS 49*  --  56   AST 67*  --  52*   ALT 56*  --  46*   ANIONGAP 8  8 8 8   EGFRNONAA >60.0  >60.0 >60.0 >60.0     Magnesium:   Recent Labs   Lab 11/13/20  0413   MG 1.6     All pertinent labs within the past 24 hours have been reviewed.    Significant Imaging: I have reviewed all pertinent imaging results/findings within the past 24 hours.      Assessment/Plan:      * Hyponatremia  Severe hyponatremia - sodium 117 on admission, variable with fluids - today 128  Check BMP q4h until >125  Lab work and history suggests dehydration  IV fluid hydration with NS appears to be improving   Continue continuous cardiac monitoring      DNR (do not resuscitate)  Daughter by beside confirmed code status as DNR      Protein-calorie malnutrition, severe  Albumin 2 on admit  Replace       Pancytopenia  Monitor and transfuse if needed      Pneumonia    Continue scheduled duoneb treatments  Blood culture NGTD  Continue Ceftriaxone      Hypotension  Resolved, Levophed has  been stopped  Possibly 2/2 sedation vs sepsis vs dehydration  Aggressive IV fluid resuscitation  when needed      Acute cystitis without hematuria  UA positive  urine cx with enterococcus continue Ceftriaxone  Blood cx NGTD  continue on Rocephin 1g IV q24h   Will continue on admission      Alcoholic cirrhosis  Alcohol abuse  Pancytopenia  thrombocytopenia  Likely source of elevated LFTs  Trend CMP daily  Thiamine, level and replace      Traumatic rhabdomyolysis  Likely 2/2 muscle degradation after lying on the floor from fall  Trend CK, improving  IV fluid hydration aggressive as tolerated        Purpura  Significant on all extremities  Likely sequela of liver disease   Platelets 30 today      Thrombocytopenia  Unknown baseline  2/2 liver cirrhosis/failure vs cancer or other  Patient received 2 units PRBCs overnight  Transfuse for plts <10,000 or higher thresholds with bleeding         Hypokalemia  Hypomagnesemia   Monitor and replace as needed  Hypokalemia resolved but will monitor and replace as needed parenterally  Likely 2/2 malnutrition/dehydration      Syncope and collapse  Questionable occurrence       Chronic obstructive pulmonary disease  Continue scheduled duoneb treatments      Wound of right leg  Chronic, possible new source of infection causing syncope/collapse  Daily dressing changes  Culture any fluid expression        VTE Risk Mitigation (From admission, onward)         Ordered     Place RAULITO hose  Until discontinued      11/09/20 1821     Reason for No Pharmacological VTE Prophylaxis  Once     Question:  Reasons:  Answer:  Thrombocytopenia    11/09/20 1821     IP VTE HIGH RISK PATIENT  Once      11/09/20 1821     Place sequential compression device  Until discontinued      11/09/20 1821                Discharge Planning   MACKENZIE:      Code Status: DNR   Is the patient medically ready for discharge?:     Reason for patient still in hospital (select all that apply): Patient trending condition  Discharge  Plan A: Other                  Sean Epstein DO  Department of Hospital Medicine   [facility] [place] [facility]

## 2020-11-14 NOTE — SUBJECTIVE & OBJECTIVE
Interval History: Patient continues to be intubated and sedated and is not responsive to verbal or painful stimuli.   Sodium level continues to be quite low and patient required 2 units PRBCs overnight due to H&H 6/16.  Levophed has been stopped patient's blood pressure is stable at this time.        Review of Systems   Unable to perform ROS: Intubated       Objective:     Vital Signs (Most Recent):  Temp: (!) 100.7 °F (38.2 °C) (11/13/20 1407)  Pulse: 74 (11/14/20 0741)  Resp: 18 (11/14/20 0741)  BP: (!) 141/91 (11/14/20 0500)  SpO2: 100 % (11/14/20 0741) Vital Signs (24h Range):  Temp:  [98.4 °F (36.9 °C)-100.7 °F (38.2 °C)] 100.7 °F (38.2 °C)  Pulse:  [72-90] 74  Resp:  [17-18] 18  SpO2:  [96 %-100 %] 100 %  BP: ()/(55-97) 141/91     Weight: 66.5 kg (146 lb 9.7 oz)  Body mass index is 19.34 kg/m².    Intake/Output Summary (Last 24 hours) at 11/14/2020 0807  Last data filed at 11/14/2020 0614  Gross per 24 hour   Intake 4232.57 ml   Output 2425 ml   Net 1807.57 ml      Physical Exam   Vitals signs reviewed.   HENT:      Mouth/Throat:      Mouth: Mucous membranes are moist.   Cardiovascular:      Rate and Rhythm: Normal rate and regular rhythm.      Heart sounds: No murmur.   Pulmonary:      Breath sounds: No wheezing.   Abdominal:      General: There is no distension.      Palpations: Abdomen is soft.   Skin:     General: Skin is warm.      Significant Labs:   CBC:   Recent Labs   Lab 11/13/20  0535 11/13/20 1840 11/14/20  0615   WBC 3.41* 4.34 3.97   HGB 6.0* 8.2* 8.6*   HCT 16.6* 23.5* 24.3*   PLT 30* 32* 35*     CMP:   Recent Labs   Lab 11/13/20  0413 11/13/20 1840 11/14/20  0615   *  128* 129* 130*   K 3.0*  3.0* 3.4* 3.5   CL 94*  94* 96 97   CO2 26  26 25 25   *  118* 112* 122*   BUN <5*  <5* 5* 5*   CREATININE <0.3*  <0.3* 0.3* 0.3*   CALCIUM 7.4*  7.4* 7.7* 7.8*   PROT 4.2*  --  4.7*   ALBUMIN 2.5*  --  2.9*   BILITOT 1.1*  --  1.0   ALKPHOS 49*  --  56   AST 67*  --  52*   ALT  56*  --  46*   ANIONGAP 8  8 8 8   EGFRNONAA >60.0  >60.0 >60.0 >60.0     Magnesium:   Recent Labs   Lab 11/13/20  0413   MG 1.6     All pertinent labs within the past 24 hours have been reviewed.    Significant Imaging: I have reviewed all pertinent imaging results/findings within the past 24 hours.

## 2020-11-14 NOTE — ASSESSMENT & PLAN NOTE
Unknown baseline  2/2 liver cirrhosis/failure vs cancer or other  Patient received 2 units PRBCs overnight  Transfuse for plts <10,000 or higher thresholds with bleeding

## 2020-11-14 NOTE — ASSESSMENT & PLAN NOTE
Severe hyponatremia - sodium 117 on admission, variable with fluids - today 130  Check BMP q4h until >125  Lab work and history suggests dehydration  IV fluid hydration with NS appears to be improving   Continue continuous cardiac monitoring

## 2020-11-14 NOTE — ASSESSMENT & PLAN NOTE
Severe hyponatremia - sodium 117 on admission, variable with fluids - today 128  Check BMP q4h until >125  Lab work and history suggests dehydration  IV fluid hydration with NS appears to be improving   Continue continuous cardiac monitoring

## 2020-11-15 NOTE — ASSESSMENT & PLAN NOTE
Severe hyponatremia on admission but improving, today 132  Check BMP q4h until >125  Lab work and history suggests dehydration  IV fluid hydration with NS appears to be improving   Continue continuous cardiac monitoring

## 2020-11-15 NOTE — SUBJECTIVE & OBJECTIVE
Interval History: Patient is intubated and sedated. He is not responsive to verbal or painful stimuli. Sodium level is still low, but increasing at 132. Levophed has been restarted this morning.  Patient has received received albumin overnight.  Repeat ABGs this morning.      Review of Systems   Unable to perform ROS: Intubated       Objective:     Vital Signs (Most Recent):  Temp: (!) 100.7 °F (38.2 °C) (11/13/20 1407)  Pulse: 66 (11/15/20 0749)  Resp: 18 (11/15/20 0749)  BP: (!) 137/96 (11/15/20 0600)  SpO2: 100 % (11/15/20 0749) Vital Signs (24h Range):  Pulse:  [63-87] 66  Resp:  [17-24] 18  SpO2:  [96 %-100 %] 100 %  BP: ()/() 137/96     Weight: 66.5 kg (146 lb 9.7 oz)  Body mass index is 19.34 kg/m².    Intake/Output Summary (Last 24 hours) at 11/15/2020 1208  Last data filed at 11/15/2020 0600  Gross per 24 hour   Intake 2242.92 ml   Output 2925 ml   Net -682.08 ml      Physical Exam     Vitals signs reviewed.   HENT:      Mouth/Throat:      Mouth: Mucous membranes are moist.   Cardiovascular:      Rate and Rhythm: Normal rate and regular rhythm.      Heart sounds: No murmur.   Pulmonary:      Breath sounds: No wheezing.   Abdominal:      General: There is no distension.      Palpations: Abdomen is soft.   Skin:     General: Skin is warm. Legs wrapped. (Skin damage/bruising on the legs- see pictures in chart.)       Significant Labs:   ABGs:   Recent Labs   Lab 11/15/20  0936   PH 7.352   PCO2 45.3*   HCO3 25.1   POCSATURATED 92*   BE 0     Blood Culture: No results for input(s): LABBLOO in the last 48 hours.  BMP:   Recent Labs   Lab 11/15/20  0451   GLU 89   *   K 3.5   CL 96   CO2 26   BUN 5*   CREATININE 0.4*   CALCIUM 7.6*     CBC:   Recent Labs   Lab 11/13/20  1840 11/14/20  0615 11/15/20  0451   WBC 4.34 3.97 3.66*   HGB 8.2* 8.6* 8.5*   HCT 23.5* 24.3* 24.5*   PLT 32* 35* 36*     CMP:   Recent Labs   Lab 11/13/20  1840 11/14/20  0615 11/14/20  2244 11/15/20  0451   * 130*  --   132*   K 3.4* 3.5 3.0* 3.5   CL 96 97  --  96   CO2 25 25  --  26   * 122*  --  89   BUN 5* 5*  --  5*   CREATININE 0.3* 0.3*  --  0.4*   CALCIUM 7.7* 7.8*  --  7.6*   PROT  --  4.7*  --  4.9*   ALBUMIN  --  2.9*  --  3.2*   BILITOT  --  1.0  --  0.9   ALKPHOS  --  56  --  50*   AST  --  52*  --  40   ALT  --  46*  --  36   ANIONGAP 8 8  --  10   EGFRNONAA >60.0 >60.0  --  >60.0     Lipase: No results for input(s): LIPASE in the last 48 hours.  Lipid Panel: No results for input(s): CHOL, HDL, LDLCALC, TRIG, CHOLHDL in the last 48 hours.  Magnesium: No results for input(s): MG in the last 48 hours.  Prealbumin: No results for input(s): PREALBUMIN in the last 48 hours.  Respiratory Culture: No results for input(s): GSRESP, RESPIRATORYC in the last 48 hours.  Urine Studies: No results for input(s): COLORU, APPEARANCEUA, PHUR, SPECGRAV, PROTEINUA, GLUCUA, KETONESU, BILIRUBINUA, OCCULTUA, NITRITE, UROBILINOGEN, LEUKOCYTESUR, RBCUA, WBCUA, BACTERIA, SQUAMEPITHEL, HYALINECASTS in the last 48 hours.    Invalid input(s): RICHARD  All pertinent labs within the past 24 hours have been reviewed.    Significant Imaging: I have reviewed all pertinent imaging results/findings within the past 24 hours.

## 2020-11-15 NOTE — NURSING
Received report from SANA Prakash. Pt resting comfortably on ventilator. Ramirez cath draining clear yellow urine. RTLIJ with multiple drips infusing.

## 2020-11-15 NOTE — NURSING
Received report from SANA Zaragoza. Pt appears comfortable on ventilator. Ramirez cath in place draining clear nasreen urine. NGT to LCWS. Precedex and Versed infusing for sedation. Multiple dressings and wounds noted.

## 2020-11-15 NOTE — PROGRESS NOTES
[facility] - ICU Hospital Medicine  Progress Note    Patient Name: David Mcintosh  MRN: [id number]  Patient Class: IP- Inpatient   Admission Date: 11/9/2020  Length of Stay: 6 days  Attending Physician: Lolly Goff MD  Primary Care Provider: Janey Garcia MD        Subjective:     Principal Problem:Hyponatremia        HPI:  Patient is a 65-year-old male who presents to the ER by ambulance for questionable syncope and collapse with possible fall.  History obtained per ER notes.  Patient found lethargic at home, possibly at home.  Unknown preceding event.  Has a history of lung cancer, COPD, falls, peripheral vascular disease, tremors, venous stasis dermatitis of the lower extremities.  In the ER, CK elevated to 7000, CRP 5.27, , lactic acid 2.5, nitrite positive on urinalysis, UDS negative, LFTs elevated. Hospital team called for admission for altered mental status with possible syncope, COPD exacerbation, UTI.     Overview/Hospital Course:  No notes on file    Interval History: Patient is intubated and sedated. He is not responsive to verbal or painful stimuli. Sodium level is still low, but increasing at 132. Levophed has been restarted this morning.  Patient has received received albumin overnight.  Repeat ABGs this morning.      Review of Systems   Unable to perform ROS: Intubated       Objective:     Vital Signs (Most Recent):  Temp: (!) 100.7 °F (38.2 °C) (11/13/20 1407)  Pulse: 66 (11/15/20 0749)  Resp: 18 (11/15/20 0749)  BP: (!) 137/96 (11/15/20 0600)  SpO2: 100 % (11/15/20 0749) Vital Signs (24h Range):  Pulse:  [63-87] 66  Resp:  [17-24] 18  SpO2:  [96 %-100 %] 100 %  BP: ()/() 137/96     Weight: 66.5 kg (146 lb 9.7 oz)  Body mass index is 19.34 kg/m².    Intake/Output Summary (Last 24 hours) at 11/15/2020 1208  Last data filed at 11/15/2020 0600  Gross per 24 hour   Intake 2242.92 ml   Output 2925 ml   Net -682.08 ml      Physical Exam     Vitals  signs reviewed.   HENT:      Mouth/Throat:      Mouth: Mucous membranes are moist.   Cardiovascular:      Rate and Rhythm: Normal rate and regular rhythm.      Heart sounds: No murmur.   Pulmonary:      Breath sounds: No wheezing.   Abdominal:      General: There is no distension.      Palpations: Abdomen is soft.   Skin:     General: Skin is warm. Legs wrapped. (Skin damage/bruising on the legs- see pictures in chart.)       Significant Labs:   ABGs:   Recent Labs   Lab 11/15/20  0936   PH 7.352   PCO2 45.3*   HCO3 25.1   POCSATURATED 92*   BE 0     Blood Culture: No results for input(s): LABBLOO in the last 48 hours.  BMP:   Recent Labs   Lab 11/15/20  0451   GLU 89   *   K 3.5   CL 96   CO2 26   BUN 5*   CREATININE 0.4*   CALCIUM 7.6*     CBC:   Recent Labs   Lab 11/13/20  1840 11/14/20  0615 11/15/20  0451   WBC 4.34 3.97 3.66*   HGB 8.2* 8.6* 8.5*   HCT 23.5* 24.3* 24.5*   PLT 32* 35* 36*     CMP:   Recent Labs   Lab 11/13/20  1840 11/14/20  0615 11/14/20  2244 11/15/20  0451   * 130*  --  132*   K 3.4* 3.5 3.0* 3.5   CL 96 97  --  96   CO2 25 25  --  26   * 122*  --  89   BUN 5* 5*  --  5*   CREATININE 0.3* 0.3*  --  0.4*   CALCIUM 7.7* 7.8*  --  7.6*   PROT  --  4.7*  --  4.9*   ALBUMIN  --  2.9*  --  3.2*   BILITOT  --  1.0  --  0.9   ALKPHOS  --  56  --  50*   AST  --  52*  --  40   ALT  --  46*  --  36   ANIONGAP 8 8  --  10   EGFRNONAA >60.0 >60.0  --  >60.0     Lipase: No results for input(s): LIPASE in the last 48 hours.  Lipid Panel: No results for input(s): CHOL, HDL, LDLCALC, TRIG, CHOLHDL in the last 48 hours.  Magnesium: No results for input(s): MG in the last 48 hours.  Prealbumin: No results for input(s): PREALBUMIN in the last 48 hours.  Respiratory Culture: No results for input(s): GSRESP, RESPIRATORYC in the last 48 hours.  Urine Studies: No results for input(s): COLORU, APPEARANCEUA, PHUR, SPECGRAV, PROTEINUA, GLUCUA, KETONESU, BILIRUBINUA, OCCULTUA, NITRITE, UROBILINOGEN,  LEUKOCYTESUR, RBCUA, WBCUA, BACTERIA, SQUAMEPITHEL, HYALINECASTS in the last 48 hours.    Invalid input(s): RICHARD  All pertinent labs within the past 24 hours have been reviewed.    Significant Imaging: I have reviewed all pertinent imaging results/findings within the past 24 hours.      Assessment/Plan:      * Hyponatremia  Severe hyponatremia on admission but improving, today 132  Check BMP q4h until >125  Lab work and history suggests dehydration  IV fluid hydration with NS appears to be improving   Continue continuous cardiac monitoring      DNR (do not resuscitate)  Daughter by beside confirmed code status as DNR      Protein-calorie malnutrition, severe  Albumin 2 on admit  Replace       Pancytopenia  Monitor and transfuse if needed      Pneumonia  Continue scheduled duoneb treatments  Blood culture NGTD  Continue Ceftriaxone      Hypotension  Levophed to keep map greater than 65  Possibly 2/2 sedation vs sepsis vs dehydration  Aggressive IV fluid resuscitation  when needed      Acute cystitis without hematuria  UA positive  urine cx with enterococcus continue Ceftriaxone  Blood cx NGTD  continue on Rocephin 1g IV q24h   Will continue on admission      Alcoholic cirrhosis  Alcohol abuse  Pancytopenia  thrombocytopenia  Likely source of previously elevated LFTs  Trend CMP daily  Thiamine, level and replace      Traumatic rhabdomyolysis  Likely 2/2 muscle degradation after lying on the floor from fall  Trend CK, improving  IV fluid hydration aggressive as tolerated        Purpura  Significant on all extremities  Likely sequela of liver disease   Platelets 35 today      Thrombocytopenia  Unknown baseline  2/2 liver cirrhosis/failure vs cancer or other  Patient received 2 units PRBCs overnight  Transfuse for plts <10,000 or higher thresholds with bleeding         Hypokalemia  Hypomagnesemia   Monitor and replace as needed  Hypokalemia resolved but will monitor and replace as needed parenterally  Likely 2/2  malnutrition/dehydration      Syncope and collapse  Questionable whether this occurred      Chronic obstructive pulmonary disease  Continue scheduled duoneb treatments      Wound of right leg  Chronic, possible new source of infection causing syncope/collapse  Daily dressing changes  Culture any fluid expression        VTE Risk Mitigation (From admission, onward)         Ordered     Place RAULITO hose  Until discontinued      11/09/20 1821     Reason for No Pharmacological VTE Prophylaxis  Once     Question:  Reasons:  Answer:  Thrombocytopenia    11/09/20 1821     IP VTE HIGH RISK PATIENT  Once      11/09/20 1821     Place sequential compression device  Until discontinued      11/09/20 1821                Discharge Planning   MACKENZIE:      Code Status: DNR   Is the patient medically ready for discharge?:     Reason for patient still in hospital (select all that apply): Patient trending condition  Discharge Plan A: Other                  Sean Epstein DO  Department of Hospital Medicine   [facility] - ICU

## 2020-11-15 NOTE — PLAN OF CARE
Problem: Communication Impairment (Mechanical Ventilation, Invasive)  Goal: Effective Communication  Outcome: Ongoing, Progressing     Problem: Device-Related Complication Risk (Mechanical Ventilation, Invasive)  Goal: Optimal Device Function  Outcome: Ongoing, Progressing     Problem: Inability to Wean (Mechanical Ventilation, Invasive)  Goal: Mechanical Ventilation Liberation  Outcome: Ongoing, Progressing     Problem: Ventilator-Induced Lung Injury (Mechanical Ventilation, Invasive)  Goal: Absence of Ventilator-Induced Lung Injury  Outcome: Ongoing, Progressing     Problem: Skin and Tissue Injury (Artificial Airway)  Goal: Absence of Device-Related Skin or Tissue Injury  Outcome: Ongoing, Progressing     Problem: Noninvasive Ventilation Acute  Goal: Effective Unassisted Ventilation and Oxygenation  Outcome: Ongoing, Progressing     Problem: Skin Injury Risk Increased  Goal: Skin Health and Integrity  Outcome: Ongoing, Progressing

## 2020-11-15 NOTE — ASSESSMENT & PLAN NOTE
Alcohol abuse  Pancytopenia  thrombocytopenia  Likely source of previously elevated LFTs  Trend CMP daily  Thiamine, level and replace

## 2020-11-15 NOTE — ASSESSMENT & PLAN NOTE
Levophed to keep map greater than 65  Possibly 2/2 sedation vs sepsis vs dehydration  Aggressive IV fluid resuscitation  when needed

## 2020-11-15 NOTE — NURSING
8.0 ETT/24cm center of mouth. Vent settings A/C 18, , Peep 5. 35% fio2. Lungs diminished bilaterally.  Pt sedated on 5 versed iv hourly, 1.4 mcg of Precedex. NS 125cc/hr. RT IJ TLC patent. CVP 8. Pt responds to loud verbal stimuli. Pupils pinpoint. Hypoactive bowel sounds. NGT RT LCWS. Esophageal temp probe present. F/C draining yellow urine to gravity.

## 2020-11-16 PROBLEM — J96.01 ACUTE HYPOXEMIC RESPIRATORY FAILURE: Status: ACTIVE | Noted: 2020-01-01

## 2020-11-16 PROBLEM — Z71.89 ADVANCE CARE PLANNING: Status: ACTIVE | Noted: 2020-01-01

## 2020-11-16 NOTE — PHYSICIAN QUERY
PT Name: David Mcintosh  MR #: [id number]     SYSTEMIC INFECTIOUS PROCESS CLARIFICATION     CDS/: Elise FRANCO, RN-BC  Contact information: [email or web address]  This form is a permanent document in the medical record.     Query Date: November 16, 2020    By submitting this query, we are merely seeking further clarification of documentation.  Please utilize your independent clinical judgment when addressing the question(s) below.  The Medical Record contains the following:    Indicators Supporting Clinical Findings Location in Medical Record   X HR         RR          BP        Temp HR 87, 83, 80, 81, 90  RR 26, 28, 26, 22,17,15,18  /83, 97/60, 81/59, 85/59, 84/60, 56/26  T 97.8, 97.6, 97.5       VS 11/9   X Lactic Acid          Procalcitonin LA 1.1, 2.5  Procal 0.11 11/9 Labs  11/9   X WBC           Bands          CRP WBC 3.56, 1.94, 3.78, 2.84, 3.41  Bands 1.0  CRP 5.27 11/9-11/13 Labs  11/10  11/9   X Culture(s) Blood Cultures-NGTD  Urine cx-ENTEROCOCCUS FAECALIS   >100,000 cfu/ml 11/9 Labs  11/9     X AMS, Confusion, LOC, etc. Hospital team called for admission for altered mental status with possible syncope, COPD exacerbation, UTI, sepsis, and acute hypoxic respiratory failure.  ED Provider Notes, 11/9   X Organ Dysfunction/Failure DDX: Septic Shock    Hypotension-Possibly 2/2 sedation vs sepsis vs dehydration    Hypotension-Resolved, Levophed has been stopped ED Provider Notes, 11/9    Hospital Medicine, H&P, 11/9      [facility], 11/14   X Bacteremia or Sepsis / Septic Sepsis protocol initiated on arrival due to reported hypotension prior to arrival. Sepsis, due to unspecified organism, unspecified whether acute organ dysfunction present ED Provider Notes, 11/9   X Known or Suspected Source of Infection documented Acute cystitis without hematuria  Pneumonia  Wound of right leg-Chronic, possible new source of infection causing syncope/collapse Hospital Medicine,11/11    (Failed) Outpatient  Treatment     X Medication Etomidate IV  0.9% NaCl infusion   Rocephin IVPB  Midazolam infusion  Norepinephrine  Precedex  Duo-Neb   11/9 MAR  11/9-11/15  11/9-11/15  11/9-11/16 11/9-11/15  11/10-11/16 11/13-11/16     X Treatment Urine cx, Blood cx, Continue Rocephin, Continue scheduled DuoNeb treatments, Daily dressing changes, Culture any fluid expression [facility] Medicine,11/11   X Other Hypotension-Possibly 2/2 sedation vs sepsis vs dehydration. Aggressive IV fluid resuscitation as tolerated [facility], H&P, 11/9     Provider, please specify diagnosis or diagnoses associated with above clinical findings.  [ x  ] Sepsis with unknown organism   [   ] Sepsis due to (suspected) organism (please specify): __________   [   ] Severe Sepsis   Please document any associated organ dysfunction:               [   ] Septic shock                [   ] Other, please specify,__________               [   ] No associated organ dysfunction  Please document associated organism:               [    ] Enterococcus Faecalis               [    ] Other or unknown organism                     [   ] Other Infectious Disease (please specify): __________   [   ] Sepsis Ruled Out   [  ] Clinically Undetermined     Please document in your progress notes daily for the duration of treatment until resolved and include in your discharge summary.

## 2020-11-16 NOTE — PHYSICIAN QUERY
PT Name: David Mcintosh  MR #: [id number]     RESPIRATORY CONDITION CLARIFICATION     CDS/: Elise FRANCO, RN-BC  Contact information: [email or web address]  This form is a permanent document in the medical record.     Query Date: November 16, 2020    By submitting this query, we are merely seeking further clarification of documentation.  Please utilize your independent clinical judgment when addressing the question(s) below.  The Medical Record contains the following   Indicators   Supporting Clinical Findings Location in Medical Record   X SOB, CHAVEZ, Wheezing, Productive Cough, Use of Accessory Muscles, etc. Accessory muscle usage present. Tachypnea noted. He has no wheezes. He has rhonchi ED Provider Notes, 11/9   X RR         ABGs         O2 sat RR 26, 28, 26, 22,17,15,18  ph 7.329, pco2 60.5, po2 143, HC03 31.8, O2 sats 99% VS 11/9  ABGs 11/9   X Hypoxia/Hypercapnia Upon arrival to the ED, he is lethargic but arousable, smells of urine, hypotensive, tachypneic, and hypoxic.     Indications: respiratory failure,    hypoxemia and hypercapnia ED Provider Notes, 11/9          ED Provider Notes, 11/9   X BiPAP/Intubation/Mechanical Ventilation Intubation-date/Time: 11/9/2020 12:24 PM ED Provider Notes, 11/9   X Supplemental O2 NC 3L-1L 11/9 VS    Home O2, Oxygen Dependence     X Respiratory Distress or Failure He is in respiratory distress.      Acute hypoxemic respiratory failure ED Provider Notes, 11/9      Hospital Medicine, 11/16   X Radiology Findings 1. Mild chronic interstitial change without focal consolidation.  2. Moderate elevation right hemidiaphragm    1. Persistent elevation right hemidiaphragm with dependent right lower lobe atelectasis.  2. Endotracheal tube placement.  3. Nasogastric tube coiled in the proximal esophagus.  This needs to be repositioned.    There is a new patchy infiltrate in the right lung consistent with pneumonitis/pneumonia.  Endotracheal tube and NG tubes in good position CXR 11/9  @ 0947 am        CXR 11/9 @ 107 pm          CXR 11/10   X Acute/Chronic Illness Hyponatremia, Hypotension, Acute cystitis without hematuria, Alcoholic cirrhosis, Traumatic rhabdomyolysis, Purpura, Thrombocytopenia, Hypokalemia, Syncope and collapse, COPD, Wound of right leg    DNR, Severe PCM, PNA, Pancytopenia, Hypomagnesemia [facility], H&P, 11/9          [facility], 11/11     X Treatment Continue scheduled DuoNeb treatments    Etomidate IV  0.9% NaCl infusion   Rocephin IVPB  Midazolam infusion  Norepinephrine  Precedex  [facility] Medicine, 11/11    11/9 MAR  11/9-11/15  11/9-11/15  11/9-11/16 11/9-11/15  11/10-11/16 11/13-11/16         X Other Per EMS, the patient was found down at home for unknown amount of time    Hospital team called for admission for altered mental status with possible syncope, COPD exacerbation, UTI. ED Provider Notes, 11/9      [facility], H&P, 11/9     Respiratory failure can be acute, chronic or both. It is generally further specified as hypoxic, hypercapnic or both. Lastly, it is important to identify an etiology, if known or suspected.   References:: https://www.acphospitalist.org/archives/2013/10/coding.htm    http://[employer]/acute-respiratory-failure-know    The noted clinical guidelines are only system guidelines and do not replace the providers clinical judgment.    Provider, please specify diagnosis or diagnoses associated with above clinical findings.     [ x   ] Acute Respiratory Failure with Hypoxia and Hypercapnia - Hypoxia: ABG pO2 < 60 mmHg or O2 sat of <91% on room air and Hypercapnia: pCO2 > 50 mmHg with pH < 7.35 and respiratory symptoms documented   [    ] Acute hypoxemic respiratory failure   [    ] Other Respiratory Diagnosis (please specify): _________________   [   ] Clinically Undetermined        Please document in your progress notes daily for the duration of treatment until resolved and include in your discharge summary.

## 2020-11-16 NOTE — NURSING
Dr. Goff and respiratory therapist at bedside, order to restart sedation medication, and plan to extubate in the am. Patients daughter at bedside in agreement with the plan.

## 2020-11-16 NOTE — CONSULTS
"  [facility] - ICU  Adult Nutrition  Consult Note    SUMMARY     Recommendations    Recommendation/Intervention: None  Goals: Comfort care  Nutrition Goal Status: other (comment)(comfort care)  Communication of RD Recs: reviewed with physician    Reason for Assessment    Reason For Assessment: identified at risk by screening criteria, NPO/clear liquids x 5 days  Diagnosis: liver disease, pulmonary disease, other (see comments)(severe protein-calorie malnutrition)  Relevant Medical History: COPD, alcoholic cirrhosis, hyponatremia, pressure ulcers  Interdisciplinary Rounds: attended  General Information Comments: MD stated no TPN recommended. Pt to be transfered to hospice for comfort care. Pt intubated.  Nutrition Discharge Planning: Pt to be discharged on diet ordered by MD.    Nutrition Risk Screen    Nutrition Risk Screen: other (see comments), large or nonhealing wound, burn or pressure injury(intubated)    Nutrition/Diet History    Patient Reported Diet/Restrictions/Preferences: general  Food Allergies: NKFA    Anthropometrics    Temp: (!) 100.7 °F (38.2 °C)  Height Method: Estimated  Height: 6' 1" (185.4 cm)  Height (inches): 73 in  Weight Method: Bed Scale  Weight: 78.7 kg (173 lb 8 oz)  Weight (lb): 173.5 lb  Ideal Body Weight (IBW), Male: 184 lb  % Ideal Body Weight, Male (lb): 65.22 %  BMI (Calculated): 22.9  BMI Grade: 18.5-24.9 - normal       Lab/Procedures/Meds    Pertinent Labs Reviewed: reviewed  Pertinent Medications Reviewed: reviewed      Estimated/Assessed Needs    Weight Used For Calorie Calculations: 78.7 kg (173 lb 8 oz)  Energy Calorie Requirements (kcal): 2361(30 kcal/kg cirrhosis / wounds)  Energy Need Method: Kcal/kg  Protein Requirements: 118.3(1.5g/kg)  Weight Used For Protein Calculations: 78.7 kg (173 lb 8 oz)  Fluid Requirements (mL): 2361  Estimated Fluid Requirement Method: RDA Method  RDA Method (mL): 2361         Nutrition Prescription Ordered    Current Diet " Order: NPO  Nutrition Order Comments: MD states no TPN recommended. Pt on comfort care.    Evaluation of Received Nutrient/Fluid Intake    Energy Calories Required: not meeting needs  Protein Required: not meeting needs  % Intake of Estimated Energy Needs: 0 - 25 %  % Meal Intake: NPO    Nutrition Risk    Level of Risk/Frequency of Follow-up: comfort care     Assessment and Plan    No new Assessment & Plan notes have been filed under this hospital service since the last note was generated.  Service: Nutrition       Monitor and Evaluation    Nutrition-Focused Physical Findings: overall appearance     Malnutrition Assessment  Malnutrition Type: chronic illness, social/environmental circumstances  Energy Intake: severe energy intake                                    Nutrition Follow-Up    RD Follow-up?: No

## 2020-11-16 NOTE — PHYSICIAN QUERY
PT Name: David Mcintosh  MR #: [id number]     PRESENT ON ADMISSION (POA) DIAGNOSIS CLARIFICATION     CDS/: Elise FRANCO, RN-BC  Contact information: [email or web address]  This form is a permanent document in the medical record.     Query Date: November 16, 2020    By submitting this query, we are merely seeking further clarification of documentation.  Please utilize your independent clinical judgment when addressing the question(s) below.     The Medical Record contains following diagnoses documented:     Clinical Information Location in Medical Records   Per EMS, the patient was found down at home for unknown amount of time. Upon arrival to the ED, he is lethargic but arousable, smells of urine, hypotensive, tachypneic, and hypoxic. Accessory muscle usage present. Tachypnea noted. He is in respiratory distress. He has no wheezes. He has rhonchi.  Intubation Date/Time: 11/9/2020 12:24 PM  Indications: respiratory failure, hypoxemia and hypercapnia    Hospital team called for admission for altered mental status with possible syncope, COPD exacerbation, UTI.    Pneumonia-Continue scheduled duoneb treatments, Blood cx NGTD,  continue Ceftriaxone      1. Mild chronic interstitial change without focal consolidation.  2. Moderate elevation right hemidiaphragm      1. Persistent elevation right hemidiaphragm with dependent right lower lobe atelectasis.  2. Endotracheal tube placement.  3. Nasogastric tube coiled in the proximal esophagus.  This needs to be repositioned.      There is a new patchy infiltrate in the right lung consistent with pneumonitis/pneumonia.  Endotracheal tube and NG tubes in good position     ED Provider Notes 11/9                [facility], 11/11            CXR 11/9 @ 0947 am        CXR 11/9 @ 107 pm            CXR 11/10     Present on admission (POA) is defined as present at the time inpatient admission occurs. Conditions that develop during an outpatient encounter, including emergency  department, observation or outpatient surgery, are considered as present on admission. [facility] 4th Quarter 2008    Please clarify the Present on Admission (POA) status of the diagnosis: Pneumonia    Present on admission (POA) status:   [   ] Yes (Y)               [ DNR order would be placed in his medical record to reflect this preference ] Clinically Undetermined (W)        [   ] No (N)                 [   ] Documentation insufficient to determine if condition is POA (U)

## 2020-11-16 NOTE — PHYSICIAN QUERY
PT Name: David Mcintosh  MR #: [id number]     Documentation Clarification      CDS/: Elise FRANCO, RN-BC  Contact information: [email or web address]    This form is a permanent document in the medical record.     Query Date: November 16, 2020    By submitting this query, we are merely seeking further clarification of documentation. Please utilize your independent clinical judgment when addressing the question(s) below.    The Medical Record reflects the following:     Clinical Findings Location in Medical Records   Hypotension-Possibly 2/2 sedation vs sepsis vs dehydration, Alcoholic cirrhosis, Purpura-likely sequela of liver disease, Hypokalemia-likely 2/2 malnutrition/dehydration. Has a history of lung cancer, COPD, falls, peripheral vascular disease, tremors, venous stasis dermatitis of the lower extremities. Weight 146 lb 9.7oz, BMI 19.34    Cachectic   [facility], H&P, 11/9                      [facility], 11/10   Protein-calorie malnutrition, severe  Albumin 2 on admit, Replace.  Alcoholic cirrhosis, Alcohol abuse, Pancytopenia, thrombocytopenia, Likely source of elevated LFTs, Trend CMP daily, Thiamine, level and replace [facility], 11/12      AND / ASPEN Clinical Characteristics (October 2011)  A minimum of two characteristics is recommended for diagnosing either moderate or severe malnutrition   Mild Malnutrition Moderate Malnutrition Severe Malnutrition   Energy Intake from p.o., TF or TPN. < 75% intake of estimated energy needs for less than 7 days < 75% intake of estimated energy needs for greater than 7 days < 50% intake of estimated energy needs for > 5 days   Weight Loss 1-2% in 1 month  5% in 3 months  7.5% in 6 months  10% in 1 year 1-2 % in 1 week  5% in 1 month  7.5% in 3 months  10% in 6 months  20% in 1 year > 2% in 1 week  > 5% in 1 month  > 7.5% in 3 months  > 10% in 6 months  > 20% in 1 year   Physical Findings     None *Mild subcutaneous fat and/or muscle loss  *Mild  fluid accumulation  *Stage II decubitus  *Surgical wound or non-healing wound *Mod/severe subcutaneous fat and/or muscle loss  *Mod/severe fluid accumulation  *Stage III or IV decubitus  *Non-healing surgical wound                                                                       Provider, please clarify the diagnosis of Severe Protein Calorie Malnutrition:    [ x  ] Severe Protein Calorie Malnutrition is confirmed and additional clinical support/decision-making indicators for the diagnosis include              [  ] please specify clinical indicators: ________________   [   ] Severe Protein Calorie Malnutrition has been ruled out   [   ] Severe Protein Calorie Malnutrition has been ruled out, other diagnosis ruled in              [  ] Mild Protein Calorie Malnutrition               [  ] Other, please specify:_____________________   [   ] Other clarification (please specify): ___________________   [  ] Clinically undetermined

## 2020-11-17 NOTE — ASSESSMENT & PLAN NOTE
Discussed with daughter throughout the day. Plan to extubate patient and place on hospice/comfort care per patient's prior wishes. Will attempt terminal extubation. Will decide between inpatient vs home hospice in AM.

## 2020-11-17 NOTE — NURSING
Pt noted to have had moderate soft brown bowel movement. Immediately after pt was changed/cleaned, pt became tachycardic with -180s and hypoxic with oxygen sats between 60-70%. Pt oral suctioned with yankeur and placed on ventimask with no improvement in sats, then placed on 100% nonrebreather. Sats slowly raised to 89-92% with no improvement in HR.  notified of events. See new orders. Pt given ativan and morphine for comfort measures. Daughter at bedside, called family in to be with patient. Will continue to monitor for comfort.    [facility] aware of recent events.

## 2020-11-17 NOTE — ASSESSMENT & PLAN NOTE
Intubated shortly after arrival to ER  Attempting vent wean daily  Planning for terminal extubation in AM most likely after discussions with family  Will initiate hospice consult in AM

## 2020-11-17 NOTE — NURSING
Pt's daughter at bedside. Sedation turned off per MD order. RT in unit. Plan is to extubate when sedation wears off.

## 2020-11-17 NOTE — SUBJECTIVE & OBJECTIVE
Interval History: no acute events, remains intubated, attempted vent wean without success today    Review of Systems   Unable to perform ROS: Intubated     Objective:     Vital Signs (Most Recent):  Temp: (!) 100.7 °F (38.2 °C) (11/13/20 1407)  Pulse: 87 (11/16/20 1805)  Resp: 20 (11/16/20 1805)  BP: (!) 71/47 (11/16/20 1805)  SpO2: 98 % (11/16/20 1805) Vital Signs (24h Range):  Pulse:  [] 87  Resp:  [17-22] 20  SpO2:  [98 %-100 %] 98 %  BP: ()/(47-92) 71/47     Weight: 78.7 kg (173 lb 8 oz)  Body mass index is 22.89 kg/m².    Intake/Output Summary (Last 24 hours) at 11/16/2020 1846  Last data filed at 11/16/2020 1800  Gross per 24 hour   Intake 4379.28 ml   Output 2850 ml   Net 1529.28 ml      Physical Exam  Vitals signs reviewed.   Constitutional:       Comments: Intubated, sedated   HENT:      Head: Normocephalic and atraumatic.      Right Ear: External ear normal.      Left Ear: External ear normal.      Mouth/Throat:      Mouth: Mucous membranes are dry.   Eyes:      Conjunctiva/sclera: Conjunctivae normal.   Cardiovascular:      Rate and Rhythm: Normal rate and regular rhythm.      Pulses: Normal pulses.      Heart sounds: No murmur. No gallop.    Pulmonary:      Comments: Intubated, mechanically ventilated  Abdominal:      General: Bowel sounds are normal. There is no distension.   Musculoskeletal:      Right lower leg: No edema.      Left lower leg: No edema.   Skin:     Comments: Improvement in purpura from admission on all extremities   Neurological:      Comments: Follows commands during sedation holiday   Psychiatric:      Comments: Unable to assess         Significant Labs:   BMP:   Recent Labs   Lab 11/16/20  0555   GLU 93   *   K 2.9*   CL 93*   CO2 27   BUN <5*   CREATININE 0.4*   CALCIUM 7.5*     CBC:   Recent Labs   Lab 11/15/20  0451 11/16/20  0555   WBC 3.66* 4.24   HGB 8.5* 8.3*   HCT 24.5* 24.0*   PLT 36* 43*     CMP:   Recent Labs   Lab 11/14/20  2244 11/15/20  0451  11/16/20  0555   NA  --  132* 132*   K 3.0* 3.5 2.9*   CL  --  96 93*   CO2  --  26 27   GLU  --  89 93   BUN  --  5* <5*   CREATININE  --  0.4* 0.4*   CALCIUM  --  7.6* 7.5*   PROT  --  4.9* 5.3*   ALBUMIN  --  3.2* 3.4*   BILITOT  --  0.9 1.6*   ALKPHOS  --  50* 44*   AST  --  40 36   ALT  --  36 30   ANIONGAP  --  10 12   EGFRNONAA  --  >60.0 >60.0     All pertinent labs within the past 24 hours have been reviewed.    Significant Imaging: I have reviewed all pertinent imaging results/findings within the past 24 hours.

## 2020-11-17 NOTE — PROGRESS NOTES
[facility] - ICU Hospital Medicine  Progress Note    Patient Name: David Mcintosh  MRN: [id number]  Patient Class: IP- Inpatient   Admission Date: 11/9/2020  Length of Stay: 7 days  Attending Physician: Lolly Goff MD  Primary Care Provider: Janey Garcia MD        Subjective:     Principal Problem:Hyponatremia        HPI:  Patient is a 65-year-old male who presents to the ER by ambulance for questionable syncope and collapse with possible fall.  History obtained per ER notes.  Patient found lethargic at home, possibly at home.  Unknown preceding event.  Has a history of lung cancer, COPD, falls, peripheral vascular disease, tremors, venous stasis dermatitis of the lower extremities.  In the ER, CK elevated to 7000, CRP 5.27, , lactic acid 2.5, nitrite positive on urinalysis, UDS negative, LFTs elevated. Hospital team called for admission for altered mental status with possible syncope, COPD exacerbation, UTI.     Overview/Hospital Course:  No notes on file    Interval History: no acute events, remains intubated, attempted vent wean without success today    Review of Systems   Unable to perform ROS: Intubated     Objective:     Vital Signs (Most Recent):  Temp: (!) 100.7 °F (38.2 °C) (11/13/20 1407)  Pulse: 87 (11/16/20 1805)  Resp: 20 (11/16/20 1805)  BP: (!) 71/47 (11/16/20 1805)  SpO2: 98 % (11/16/20 1805) Vital Signs (24h Range):  Pulse:  [] 87  Resp:  [17-22] 20  SpO2:  [98 %-100 %] 98 %  BP: ()/(47-92) 71/47     Weight: 78.7 kg (173 lb 8 oz)  Body mass index is 22.89 kg/m².    Intake/Output Summary (Last 24 hours) at 11/16/2020 1846  Last data filed at 11/16/2020 1800  Gross per 24 hour   Intake 4379.28 ml   Output 2850 ml   Net 1529.28 ml      Physical Exam  Vitals signs reviewed.   Constitutional:       Comments: Intubated, sedated   HENT:      Head: Normocephalic and atraumatic.      Right Ear: External ear normal.      Left Ear: External ear normal.       Mouth/Throat:      Mouth: Mucous membranes are dry.   Eyes:      Conjunctiva/sclera: Conjunctivae normal.   Cardiovascular:      Rate and Rhythm: Normal rate and regular rhythm.      Pulses: Normal pulses.      Heart sounds: No murmur. No gallop.    Pulmonary:      Comments: Intubated, mechanically ventilated  Abdominal:      General: Bowel sounds are normal. There is no distension.   Musculoskeletal:      Right lower leg: No edema.      Left lower leg: No edema.   Skin:     Comments: Improvement in purpura from admission on all extremities   Neurological:      Comments: Follows commands during sedation holiday   Psychiatric:      Comments: Unable to assess         Significant Labs:   BMP:   Recent Labs   Lab 11/16/20  0555   GLU 93   *   K 2.9*   CL 93*   CO2 27   BUN <5*   CREATININE 0.4*   CALCIUM 7.5*     CBC:   Recent Labs   Lab 11/15/20  0451 11/16/20  0555   WBC 3.66* 4.24   HGB 8.5* 8.3*   HCT 24.5* 24.0*   PLT 36* 43*     CMP:   Recent Labs   Lab 11/14/20  2244 11/15/20  0451 11/16/20  0555   NA  --  132* 132*   K 3.0* 3.5 2.9*   CL  --  96 93*   CO2  --  26 27   GLU  --  89 93   BUN  --  5* <5*   CREATININE  --  0.4* 0.4*   CALCIUM  --  7.6* 7.5*   PROT  --  4.9* 5.3*   ALBUMIN  --  3.2* 3.4*   BILITOT  --  0.9 1.6*   ALKPHOS  --  50* 44*   AST  --  40 36   ALT  --  36 30   ANIONGAP  --  10 12   EGFRNONAA  --  >60.0 >60.0     All pertinent labs within the past 24 hours have been reviewed.    Significant Imaging: I have reviewed all pertinent imaging results/findings within the past 24 hours.      Assessment/Plan:      * Hyponatremia  Severe hyponatremia on admission but improved  Lab work and history suggests dehydration  IV fluid hydration with NS appeared to improve  Continue continuous cardiac monitoring      Advance care planning  Discussed with daughter throughout the day. Plan to extubate patient and place on hospice/comfort care per patient's prior wishes. Will attempt terminal extubation.  Will decide between inpatient vs home hospice in AM.      Acute hypoxemic respiratory failure  Intubated shortly after arrival to ER  Attempting vent wean daily  Planning for terminal extubation in AM most likely after discussions with family  Will initiate hospice consult in AM      DNR (do not resuscitate)  Daughter by beside confirmed code status as DNR      Protein-calorie malnutrition, severe  Albumin 2 on admit  Replace       Pancytopenia  Monitor and transfuse if needed      Pneumonia  Continue scheduled duoneb treatments  Blood culture NGTD  Continue Ceftriaxone      Hypotension  Levophed to keep map greater than 65  Possibly 2/2 sedation vs sepsis vs dehydration  Aggressive IV fluid resuscitation  when needed      Acute cystitis without hematuria  UA positive  urine cx with enterococcus continue Ceftriaxone  Blood cx NGTD  continue on Rocephin 1g IV q24h   Will continue on admission      Alcoholic cirrhosis  Alcohol abuse  Pancytopenia  thrombocytopenia  Likely source of previously elevated LFTs  Trend CMP daily  Thiamine, level and replace      Traumatic rhabdomyolysis  Likely 2/2 muscle degradation after lying on the floor from fall  Trend CK, improving  IV fluid hydration aggressive as tolerated        Purpura  Significant on all extremities  Likely sequela of liver disease   Platelets 35 today      Thrombocytopenia  Unknown baseline  2/2 liver cirrhosis/failure vs cancer or other  Patient received 2 units PRBCs overnight  Transfuse for plts <10,000 or higher thresholds with bleeding         Hypokalemia  Hypomagnesemia   Monitor and replace as needed  Hypokalemia resolved but will monitor and replace as needed parenterally  Likely 2/2 malnutrition/dehydration      Syncope and collapse  Questionable whether this occurred      Chronic obstructive pulmonary disease  Continue scheduled duoneb treatments      Wound of right leg  Chronic, possible new source of infection causing syncope/collapse  Daily  dressing changes  Culture any fluid expression        VTE Risk Mitigation (From admission, onward)         Ordered     Place RAULITO hose  Until discontinued      11/09/20 1821     Reason for No Pharmacological VTE Prophylaxis  Once     Question:  Reasons:  Answer:  Thrombocytopenia    11/09/20 1821     IP VTE HIGH RISK PATIENT  Once      11/09/20 1821     Place sequential compression device  Until discontinued      11/09/20 1821                Discharge Planning   MACKENZIE:      Code Status: DNR   Is the patient medically ready for discharge?:     Reason for patient still in hospital (select all that apply): Treatment  Discharge Plan A: Other                  Lolly Goff MD  Department of Hospital Medicine   [facility] - ICU

## 2020-11-17 NOTE — NURSING
Call back from [place] with MICHAEL. Pt ruled out for organ donation r/t hx of cancer.   Ref#[id number]

## 2020-11-17 NOTE — PLAN OF CARE
Problem: Adult Inpatient Plan of Care  Goal: Optimal Comfort and Wellbeing  Outcome: Ongoing, Progressing     Problem: Adult Inpatient Plan of Care  Goal: Readiness for Transition of Care  Outcome: Ongoing, Progressing     Problem: Communication Impairment (Mechanical Ventilation, Invasive)  Goal: Effective Communication  Outcome: Ongoing, Progressing     Problem: Skin Injury Risk Increased  Goal: Skin Health and Integrity  Outcome: Ongoing, Progressing     Problem: Fluid Imbalance (Pneumonia)  Goal: Fluid Balance  Outcome: Ongoing, Progressing     Problem: Wound  Goal: Optimal Wound Healing  Outcome: Ongoing, Progressing

## 2020-11-17 NOTE — NURSING
Pt extubated with MD and RT at bedside. Placed on nasal canula. Prn Ativan given for comfort. Daughter at bedside and encouraged to ask for any needs. End of life care and comfort measures discussed with pt's daughter. Voiced understanding. No needs at this time.

## 2020-11-17 NOTE — ASSESSMENT & PLAN NOTE
Severe hyponatremia on admission but improved  Lab work and history suggests dehydration  IV fluid hydration with NS appeared to improve  Continue continuous cardiac monitoring

## 2020-11-18 NOTE — PLAN OF CARE
11/16/20 0930   Discharge Assessment   Assessment Type Discharge Planning Reassessment     Pts daughter in room requesting this SW provide information for hospice care at home. SW discussed criteria for qualifying and services to expect at home. Daughter verbalizes understanding of her father's condition and wants to uphold his wishes of being home and not in a facility for terminal care. She verbalizes to this SW she desires the pt to be extubated with supportive care only and transitioned home with hospice support. Daughter has family that is willing to assist her with his care once home. This decision has also been communicated to the attending physician.  SW provided comfort and support to the pts daughter that is tearful and visibly sad of the thoughts of losing her father. Will continue to assist with discharge plans and support as needed.

## 2020-11-18 NOTE — NURSING
IJ central line and waite catheter removed.  Daughter Chele came by after being notified of Mr. Mcelroy passing.

## 2020-11-18 NOTE — ED PROVIDER NOTES
Called to the patient room by house supervisor for evaluation/pronouncement of death.    Upon entering room patient was unconscious/unresponsive with eyes open sitting upright at approximately 45 degree angle.  Asystole noted to telemetry monitoring.  No palpable pulse.  No heart sounds on auscultation.  No spontaneous respirations.  Pupils fixed, nonreactive; no corneal reflex.  No spontaneous movement.  Time of death 9:44 p.m.    Patient admitting provider notified by attending RN.     David Rodriguez,   11/17/20 3094

## 2020-11-18 NOTE — NURSING
Spoke with Allison Parikh at [facility]  Re:  Case [id number]  Not a candidate for organ harvest due to cancer    Pronounced per ERMD at 6124

## 2020-11-18 NOTE — NURSING
Family left bedside.   Pt eyes open but appears to be glazed over.   100% o2 on  Heart rate 130's  Appears to have agonal respirations.

## 2020-11-18 NOTE — PLAN OF CARE
11/16/20 0930   Medicare Message   Important Message from Medicare regarding Discharge Appeal Rights Given to patient/caregiver;Explained to patient/caregiver;Signed/date by patient/caregiver   Date IMM was signed 11/16/20   Time IMM was signed 0930

## 2020-11-18 NOTE — PLAN OF CARE
20 1437   Final Note   Assessment Type Final Discharge Note   Anticipated Discharge Disposition      Pt  at hospital prior to discharging home with hospice.

## 2020-11-18 NOTE — PLAN OF CARE
11/17/20 0352   Discharge Reassessment   Assessment Type Discharge Planning Reassessment   [facility]   Provided patient/caregiver education on the expected discharge date and the discharge plan Yes     KRISTINA confirmed with this pts daughter she wishes to extubate the pt and take him home with hospice support. After discussing options with the pts daughter on hospice agencies, she chose [facility]. KRISTINA contacted [facility] office to provide verbal referral and faxed clinicals for planned home admission. Representative came to the facility and met with the family to answer questions and coordinate set up of DME in the home. Plan is to discharge pt home with hospice support if appropriate after extubation.

## 2020-11-24 NOTE — PHYSICIAN QUERY
PT Name: David Mcintosh  MR #: [id number]     PRESENT ON ADMISSION (POA) DIAGNOSIS CLARIFICATION     CDS/: Elise FRANCO, RN-BC  Contact information: [email or web address]  This form is a permanent document in the medical record.     Query Date: November 23, 2020    By submitting this query, we are merely seeking further clarification of documentation.  Please utilize your independent clinical judgment when addressing the question(s) below.     The Medical Record contains following diagnoses documented:     Clinical Information Location in Medical Records   Per EMS, the patient was found down at home for unknown amount of time. Upon arrival to the ED, he is lethargic but arousable, smells of urine, hypotensive, tachypneic, and hypoxic. Accessory muscle usage present. Tachypnea noted. He is in respiratory distress. He has no wheezes. He has rhonchi.  Intubation Date/Time: 11/9/2020 12:24 PM  Indications: respiratory failure, hypoxemia and hypercapnia    Hospital team called for admission for altered mental status with possible syncope, COPD exacerbation, UTI.    Pneumonia-Continue scheduled duoneb treatments, Blood cx NGTD,  continue Ceftriaxone      1. Mild chronic interstitial change without focal consolidation.  2. Moderate elevation right hemidiaphragm      1. Persistent elevation right hemidiaphragm with dependent right lower lobe atelectasis.  2. Endotracheal tube placement.  3. Nasogastric tube coiled in the proximal esophagus.  This needs to be repositioned.      There is a new patchy infiltrate in the right lung consistent with pneumonitis/pneumonia.  Endotracheal tube and NG tubes in good position     ED Provider Notes 11/9                [facility] Medicine, 11/11            CXR 11/9 @ 0947 am        CXR 11/9 @ 107 pm            CXR 11/10     Present on admission (POA) is defined as present at the time inpatient admission occurs. Conditions that develop during an outpatient encounter, including emergency  department, observation or outpatient surgery, are considered as present on admission. [facility] 4th Quarter 2008    Please clarify the Present on Admission (POA) status of the diagnosis: Pneumonia    Present on admission (POA) status:   [ x  ] Yes (Y)                    [   ] No (N)      [   ] Clinically Undetermined (W)    [   ] Documentation insufficient to determine if condition is POA (U)

## 2020-12-17 NOTE — DISCHARGE SUMMARY
[facility] - ICU Hospital Medicine  Discharge Summary      Patient Name: David Mcintosh  MRN: [id number]  Admission Date: 2020  Hospital Length of Stay: 8 days  Discharge Date and Time: 2020 11:30 PM  Attending Physician: No att. providers found   Discharging Provider: Lolly Goff MD  Primary Care Provider: Janey Garcia MD        HPI:   Patient is a 65-year-old male who presents to the ER by ambulance for questionable syncope and collapse with possible fall.  History obtained per ER notes.  Patient found lethargic at home, possibly at home.  Unknown preceding event.  Has a history of lung cancer, COPD, falls, peripheral vascular disease, tremors, venous stasis dermatitis of the lower extremities.  In the ER, CK elevated to 7000, CRP 5.27, , lactic acid 2.5, nitrite positive on urinalysis, UDS negative, LFTs elevated. Hospital team called for admission for altered mental status with possible syncope, COPD exacerbation, UTI.     * No surgery found *      Hospital Course:   Admitted and started on IVF resuscitation which improved hypernatremia. Patient intubated shortly after arrival to ER. Attempted vent wean daily with no success. Had several conversations with family and eventually decision made to withdraw care and terminally extubate. Patient made DNR early in course/on admission.  shortly after extubation.     Consults:     Final Active Diagnoses:    Diagnosis Date Noted POA    PRINCIPAL PROBLEM:  Hyponatremia [E87.1] 2020 Yes    Acute hypoxemic respiratory failure [J96.01] 2020 Yes    Advance care planning [Z71.89] 2020 Not Applicable    Pneumonia [J18.9] 2020 Yes    Pancytopenia [D61.818] 2020 Yes    Protein-calorie malnutrition, severe [E43] 2020 Yes    DNR (do not resuscitate) [Z66] 2020 Yes    Syncope and collapse [R55] 2020 Yes    Hypokalemia [E87.6] 2020 Yes    Thrombocytopenia [D69.6]  Spiritual Care Partner Volunteer visited patient in 5 Med Surg on 12/26/17.   Documented by:  Latosha Escamilla [id number] [facility] ([id number]) 2020 Yes    Purpura [D69.2] 2020 Yes    Traumatic rhabdomyolysis [T79.6XXA] 2020 Yes    Alcoholic cirrhosis [K70.30] 2020 Yes    Acute cystitis without hematuria [N30.00] 2020 Yes    Hypotension [I95.9] 2020 Yes    Chronic obstructive pulmonary disease [J44.9] 10/26/2020 Yes    Wound of right leg [S81.801A] 2020 Yes      Problems Resolved During this Admission:      Discharged Condition:     Disposition:     Follow Up:    Patient Instructions:   No discharge procedures on file.  Medications:  None (patient  at medical facility)    Significant Diagnostic Studies:   X-Ray Chest AP Portable  Order: [id number]  Status:  Final result   Visible to patient:  No (inaccessible in Patient Portal)   Next appt:  None  Details    Reading Physician Reading Date Result Priority   Inez Lynch MD  [phone] 2020 Routine      Narrative & Impression     EXAMINATION:  XR CHEST AP PORTABLE     CLINICAL HISTORY:  intubation;     TECHNIQUE:  Single frontal view of the chest was performed.     COMPARISON:  11/10/2020 17:58 hours     FINDINGS:  NG tube traverses the esophagus extending beneath the level the diaphragm with the tip not included on the film.  Right internal jugular venous catheter is present with the tip at the level the proximal SVC.  Endotracheal tube is present with the tip projecting approximately 7 cm above the phyllis.     Other lines project over the chest     The cardiomediastinal silhouette is stable.  Right hemidiaphragm is slightly elevated.  Postoperative changes with surgical clips right hilum suggesting postsurgical change from right lobectomy with subsequent right lung volume loss.  Mild interstitial changes in the lung bases.  No consolidative process.  No pleural effusion.     Impression:     Lines and tubes remain in place with positions described.  Postoperative changes with volume loss right hemithorax and mild bibasilar  infiltrates or hypoventilatory changes with basilar aeration appearing slightly decreased compared to the prior exam.        Electronically signed by: Inez Lynch MD  Date:                                            11/14/2020  Time:                                           15:23               Pending Diagnostic Studies:     None        Indwelling Lines/Drains at time of discharge:   Lines/Drains/Airways     Central Venous Catheter Line            Percutaneous Central Line Insertion/Assessment - Triple Lumen  11/10/20 1730 right internal jugular 36 days          Drain                 Urethral Catheter 11/09/20 0939 Straight-tip 10 Fr. 38 days                Time spent on the discharge of patient: 75 minutes  Patient was seen and examined on the date of discharge and determined to be suitable for discharge.         Lolly Goff MD  Department of Hospital Medicine  [facility] - [facility]
